# Patient Record
Sex: FEMALE | Race: WHITE | NOT HISPANIC OR LATINO | Employment: FULL TIME | URBAN - NONMETROPOLITAN AREA
[De-identification: names, ages, dates, MRNs, and addresses within clinical notes are randomized per-mention and may not be internally consistent; named-entity substitution may affect disease eponyms.]

---

## 2017-12-07 ENCOUNTER — APPOINTMENT (OUTPATIENT)
Dept: PREADMISSION TESTING | Facility: HOSPITAL | Age: 37
End: 2017-12-07

## 2017-12-07 VITALS
OXYGEN SATURATION: 96 % | HEART RATE: 99 BPM | DIASTOLIC BLOOD PRESSURE: 76 MMHG | BODY MASS INDEX: 35.45 KG/M2 | SYSTOLIC BLOOD PRESSURE: 138 MMHG | RESPIRATION RATE: 16 BRPM | HEIGHT: 64 IN | WEIGHT: 207.67 LBS

## 2017-12-07 DIAGNOSIS — T83.9XXD COMPLICATION OF INTRAUTERINE DEVICE (IUD), UNSPECIFIED COMPLICATION, SUBSEQUENT ENCOUNTER: Primary | ICD-10-CM

## 2017-12-07 DIAGNOSIS — T83.9XXD COMPLICATION OF INTRAUTERINE DEVICE (IUD), UNSPECIFIED COMPLICATION, SUBSEQUENT ENCOUNTER: ICD-10-CM

## 2017-12-07 LAB
BACTERIA UR QL AUTO: ABNORMAL /HPF
BASOPHILS # BLD AUTO: 0.05 10*3/MM3 (ref 0–0.2)
BASOPHILS NFR BLD AUTO: 0.5 % (ref 0–2)
BILIRUB UR QL STRIP: NEGATIVE
CLARITY UR: CLEAR
COLOR UR: YELLOW
DEPRECATED RDW RBC AUTO: 44.7 FL (ref 40–54)
EOSINOPHIL # BLD AUTO: 0.18 10*3/MM3 (ref 0–0.7)
EOSINOPHIL NFR BLD AUTO: 1.8 % (ref 0–4)
ERYTHROCYTE [DISTWIDTH] IN BLOOD BY AUTOMATED COUNT: 13.7 % (ref 12–15)
GLUCOSE UR STRIP-MCNC: NEGATIVE MG/DL
HCT VFR BLD AUTO: 44.3 % (ref 37–47)
HGB BLD-MCNC: 14.7 G/DL (ref 12–16)
HGB UR QL STRIP.AUTO: ABNORMAL
HYALINE CASTS UR QL AUTO: ABNORMAL /LPF
IMM GRANULOCYTES # BLD: 0.03 10*3/MM3 (ref 0–0.03)
IMM GRANULOCYTES NFR BLD: 0.3 % (ref 0–5)
KETONES UR QL STRIP: ABNORMAL
LEUKOCYTE ESTERASE UR QL STRIP.AUTO: NEGATIVE
LYMPHOCYTES # BLD AUTO: 3.31 10*3/MM3 (ref 0.72–4.86)
LYMPHOCYTES NFR BLD AUTO: 33 % (ref 15–45)
MCH RBC QN AUTO: 29.6 PG (ref 28–32)
MCHC RBC AUTO-ENTMCNC: 33.2 G/DL (ref 33–36)
MCV RBC AUTO: 89.1 FL (ref 82–98)
MONOCYTES # BLD AUTO: 0.85 10*3/MM3 (ref 0.19–1.3)
MONOCYTES NFR BLD AUTO: 8.5 % (ref 4–12)
NEUTROPHILS # BLD AUTO: 5.62 10*3/MM3 (ref 1.87–8.4)
NEUTROPHILS NFR BLD AUTO: 55.9 % (ref 39–78)
NITRITE UR QL STRIP: NEGATIVE
NRBC BLD MANUAL-RTO: 0 /100 WBC (ref 0–0)
PH UR STRIP.AUTO: <=5 [PH] (ref 5–8)
PLATELET # BLD AUTO: 295 10*3/MM3 (ref 130–400)
PMV BLD AUTO: 10.7 FL (ref 6–12)
PROT UR QL STRIP: NEGATIVE
RBC # BLD AUTO: 4.97 10*6/MM3 (ref 4.2–5.4)
RBC # UR: ABNORMAL /HPF
REF LAB TEST METHOD: ABNORMAL
SP GR UR STRIP: >=1.03 (ref 1–1.03)
SQUAMOUS #/AREA URNS HPF: ABNORMAL /HPF
UROBILINOGEN UR QL STRIP: ABNORMAL
WBC NRBC COR # BLD: 10.04 10*3/MM3 (ref 4.8–10.8)
WBC UR QL AUTO: ABNORMAL /HPF

## 2017-12-07 PROCEDURE — 85025 COMPLETE CBC W/AUTO DIFF WBC: CPT | Performed by: OBSTETRICS & GYNECOLOGY

## 2017-12-07 PROCEDURE — 81001 URINALYSIS AUTO W/SCOPE: CPT | Performed by: OBSTETRICS & GYNECOLOGY

## 2017-12-07 PROCEDURE — 93005 ELECTROCARDIOGRAM TRACING: CPT

## 2017-12-07 PROCEDURE — 93010 ELECTROCARDIOGRAM REPORT: CPT | Performed by: INTERNAL MEDICINE

## 2017-12-07 PROCEDURE — 36415 COLL VENOUS BLD VENIPUNCTURE: CPT

## 2017-12-07 RX ORDER — AMOXICILLIN 500 MG/1
1000 CAPSULE ORAL 2 TIMES DAILY
COMMUNITY
Start: 2017-12-04 | End: 2017-12-13 | Stop reason: HOSPADM

## 2017-12-07 NOTE — DISCHARGE INSTRUCTIONS
DAY OF SURGERY INSTRUCTIONS        YOUR SURGEON: Dr. Whitehead    PROCEDURE: Hysteroscopy, IUD Removal    DATE OF SURGERY: December 13, 2017    ARRIVAL TIME: AS DIRECTED BY OFFICE    DAY OF SURGERY TAKE ONLY THESE MEDICATIONS: NONE            BEFORE YOU COME TO THE HOSPITAL  (Pre-op instructions)  • Do not eat, drink, smoke or chew gum after midnight the night before surgery.  This also includes no mints.  • Morning of surgery take only the medicines you have been instructed with a sip of water unless otherwise instructed  by your physician.  • Do not shave, wear makeup or dark nail polish.  • Remove all jewelry including rings.  • Leave anything you consider valuable at home.  • Leave your suitcase in the car until after your surgery.  • Bring the following with you if applicable:  o Picture ID and insurance, Medicare or Medicaid cards  o Co-pay/deductible required by insurance (cash, check, credit card)  o Copy of advance directive, living will or power-of- documents if not brought to PAT  o CPAP or BIPAP mask and tubing  o Relaxation aids (MP3 player, book, magazine)  • On the day of surgery check in at registration located at the main entrance of the hospital.       Outpatient Surgery Guidelines, Adult  Outpatient procedures are those for which the person having the procedure is allowed to go home the same day as the procedure. Various procedures are done on an outpatient basis. You should follow some general guidelines if you will be having an outpatient procedure.  LET YOUR HEALTH CARE PROVIDER KNOW ABOUT:  · Any allergies you have.  · All medicines you are taking, including vitamins, herbs, eye drops, creams, and over-the-counter medicines.  · Previous problems you or members of your family have had with the use of anesthetics.  · Any blood disorders you have.  · Previous surgeries you have had.  · Medical conditions you have.  RISKS AND COMPLICATIONS  Your health care provider will discuss possible  risks and complications with you before surgery. Common risks and complications include:    · Problems due to the use of anesthetics.  · Blood loss and replacement (does not apply to minor surgical procedures).  · Temporary increase in pain due to surgery.  · Uncorrected pain or problems that the surgery was meant to correct.  · Infection.  · New damage.  BEFORE THE PROCEDURE  · Ask your health care provider about changing or stopping your regular medicines. You may need to stop taking certain medicines in the days or weeks before the procedure.  · Stop smoking at least 2 weeks before surgery. This lowers your risk for complications during and after surgery. Ask your health care provider for help with this if needed.  · Eat your usual meals and a light supper the day before surgery. Continue fluid intake. Do not drink alcohol.  · Do not eat or drink after midnight the night before your surgery.   · Arrange for someone to take you home and to stay with you for 24 hours after the procedure. Medicine given for your procedure may affect your ability to drive or to care for yourself.  · Call your health care provider's office if you develop an illness or problem that may prevent you from safely having your procedure.  AFTER THE PROCEDURE  After surgery, you will be taken to a recovery area, where your progress will be monitored. If there are no complications, you will be allowed to go home when you are awake, stable, and taking fluids well. You may have numbness around the surgical site. Healing will take some time. You will have tenderness at the surgical site and may have some swelling and bruising. You may also have some nausea.  HOME CARE INSTRUCTIONS  · Do not drive for 24 hours, or as directed by your health care provider. Do not drive while taking prescription pain medicines.  · Do not drink alcohol for 24 hours.  · Do not make important decisions or sign legal documents for 24 hours.  · You may resume a normal  diet and activities as directed.  · Do not lift anything heavier than 10 pounds (4.5 kg) or play contact sports until your health care provider says it is okay.  · Change your bandages (dressings) as directed.  · Only take over-the-counter or prescription medicines as directed by your health care provider.  · Follow up with your health care provider as directed.  SEEK MEDICAL CARE IF:  · You have increased bleeding (more than a small spot) from the surgical site.  · You have redness, swelling, or increasing pain in the wound.  · You see pus coming from the wound.  · You have a fever.  · You notice a bad smell coming from the wound or dressing.  · You feel lightheaded or faint.  · You develop a rash.  · You have trouble breathing.  · You develop allergies.  MAKE SURE YOU:  · Understand these instructions.  · Will watch your condition.  · Will get help right away if you are not doing well or get worse.     This information is not intended to replace advice given to you by your health care provider. Make sure you discuss any questions you have with your health care provider.     Document Released: 09/12/2002 Document Revised: 05/03/2016 Document Reviewed: 05/22/2014  [employer] Interactive Patient Education ©2016 [employer]       Fall Prevention in Hospitals, Adult  As a hospital patient, your condition and the treatments you receive can increase your risk for falls. Some additional risk factors for falls in a hospital include:  · Being in an unfamiliar environment.  · Being on bed rest.  · Your surgery.  · Taking certain medicines.  · Your tubing requirements, such as intravenous (IV) therapy or catheters.  It is important that you learn how to decrease fall risks while at the hospital. Below are important tips that can help prevent falls.  SAFETY TIPS FOR PREVENTING FALLS  Talk about your risk of falling.  · Ask your health care provider why you are at risk for falling. Is it your medicine, illness, tubing  placement, or something else?  · Make a plan with your health care provider to keep you safe from falls.  · Ask your health care provider or pharmacist about side effects of your medicines. Some medicines can make you dizzy or affect your coordination.  Ask for help.  · Ask for help before getting out of bed. You may need to press your call button.  · Ask for assistance in getting safely to the toilet.  · Ask for a walker or cane to be put at your bedside. Ask that most of the side rails on your bed be placed up before your health care provider leaves the room.  · Ask family or friends to sit with you.  · Ask for things that are out of your reach, such as your glasses, hearing aids, telephone, bedside table, or call button.  Follow these tips to avoid falling:  · Stay lying or seated, rather than standing, while waiting for help.  · Wear rubber-soled slippers or shoes whenever you walk in the hospital.  · Avoid quick, sudden movements.  ¨ Change positions slowly.  ¨ Sit on the side of your bed before standing.  ¨ Stand up slowly and wait before you start to walk.  · Let your health care provider know if there is a spill on the floor.  · Pay careful attention to the medical equipment, electrical cords, and tubes around you.  · When you need help, use your call button by your bed or in the bathroom. Wait for one of your health care providers to help you.  · If you feel dizzy or unsure of your footing, return to bed and wait for assistance.  · Avoid being distracted by the TV, telephone, or another person in your room.  · Do not lean or support yourself on rolling objects, such as IV poles or bedside tables.     This information is not intended to replace advice given to you by your health care provider. Make sure you discuss any questions you have with your health care provider.     Document Released: 12/15/2001 Document Revised: 01/08/2016 Document Reviewed: 08/25/2013  [employer] Interactive Patient Education ©2016  [employer]       Surgical Site Infections FAQs  What is a Surgical Site Infection (SSI)?  A surgical site infection is an infection that occurs after surgery in the part of the body where the surgery took place. Most patients who have surgery do not develop an infection. However, infections develop in about 1 to 3 out of every 100 patients who have surgery.  Some of the common symptoms of a surgical site infection are:  · Redness and pain around the area where you had surgery  · Drainage of cloudy fluid from your surgical wound  · Fever  Can SSIs be treated?  Yes. Most surgical site infections can be treated with antibiotics. The antibiotic given to you depends on the bacteria (germs) causing the infection. Sometimes patients with SSIs also need another surgery to treat the infection.  What are some of the things that hospitals are doing to prevent SSIs?  To prevent SSIs, doctors, nurses, and other healthcare providers:  · Clean their hands and arms up to their elbows with an antiseptic agent just before the surgery.  · Clean their hands with soap and water or an alcohol-based hand rub before and after caring for each patient.  · May remove some of your hair immediately before your surgery using electric clippers if the hair is in the same area where the procedure will occur. They should not shave you with a razor.  · Wear special hair covers, masks, gowns, and gloves during surgery to keep the surgery area clean.  · Give you antibiotics before your surgery starts. In most cases, you should get antibiotics within 60 minutes before the surgery starts and the antibiotics should be stopped within 24 hours after surgery.  · Clean the skin at the site of your surgery with a special soap that kills germs.  What can I do to help prevent SSIs?  Before your surgery:  · Tell your doctor about other medical problems you may have. Health problems such as allergies, diabetes, and obesity could affect your surgery and your  treatment.  · Quit smoking. Patients who smoke get more infections. Talk to your doctor about how you can quit before your surgery.  · Do not shave near where you will have surgery. Shaving with a razor can irritate your skin and make it easier to develop an infection.  At the time of your surgery:  · Speak up if someone tries to shave you with a razor before surgery. Ask why you need to be shaved and talk with your surgeon if you have any concerns.  · Ask if you will get antibiotics before surgery.  After your surgery:  · Make sure that your healthcare providers clean their hands before examining you, either with soap and water or an alcohol-based hand rub.  · If you do not see your providers clean their hands, please ask them to do so.  · Family and friends who visit you should not touch the surgical wound or dressings.  · Family and friends should clean their hands with soap and water or an alcohol-based hand rub before and after visiting you. If you do not see them clean their hands, ask them to clean their hands.  What do I need to do when I go home from the hospital?  · Before you go home, your doctor or nurse should explain everything you need to know about taking care of your wound. Make sure you understand how to care for your wound before you leave the hospital.  · Always clean your hands before and after caring for your wound.  · Before you go home, make sure you know who to contact if you have questions or problems after you get home.  · If you have any symptoms of an infection, such as redness and pain at the surgery site, drainage, or fever, call your doctor immediately.  If you have additional questions, please ask your doctor or nurse.  Developed and co-sponsored by The Society for Healthcare Epidemiology of [place] (SHEA); Infectious Diseases Society of [place] (IDSA); American Hospital Association; Association for Professionals in Infection Control and Epidemiology (APIC); Centers for Disease  Control and Prevention (CDC); and The Joint Commission.     This information is not intended to replace advice given to you by your health care provider. Make sure you discuss any questions you have with your health care provider.     Document Released: 12/23/2014 Document Revised: 01/08/2016 Document Reviewed: 03/02/2016  [email or web address] Interactive Patient Education ©2016 [email or web address]     PATIENT/FAMILY/RESPONSIBLE PARTY VERBALIZES UNDERSTANDING OF ABOVE EDUCATION.  COPY OF PAIN SCALE GIVEN AND REVIEWED WITH VERBALIZED UNDERSTANDING.

## 2017-12-13 ENCOUNTER — ANESTHESIA EVENT (OUTPATIENT)
Dept: PERIOP | Facility: HOSPITAL | Age: 37
End: 2017-12-13

## 2017-12-13 ENCOUNTER — HOSPITAL ENCOUNTER (OUTPATIENT)
Facility: HOSPITAL | Age: 37
Setting detail: HOSPITAL OUTPATIENT SURGERY
Discharge: HOME | End: 2017-12-13
Attending: OBSTETRICS & GYNECOLOGY | Admitting: OBSTETRICS & GYNECOLOGY

## 2017-12-13 ENCOUNTER — ANESTHESIA (OUTPATIENT)
Dept: PERIOP | Facility: HOSPITAL | Age: 37
End: 2017-12-13

## 2017-12-13 VITALS
HEART RATE: 78 BPM | TEMPERATURE: 98 F | RESPIRATION RATE: 16 BRPM | DIASTOLIC BLOOD PRESSURE: 63 MMHG | OXYGEN SATURATION: 96 % | SYSTOLIC BLOOD PRESSURE: 119 MMHG

## 2017-12-13 LAB
ABO GROUP BLD: NORMAL
BLD GP AB SCN SERPL QL: NEGATIVE
HCG INTACT+B SERPL-ACNC: <2.39 MIU/ML (ref 0–5)
RH BLD: POSITIVE

## 2017-12-13 PROCEDURE — 86850 RBC ANTIBODY SCREEN: CPT | Performed by: OBSTETRICS & GYNECOLOGY

## 2017-12-13 PROCEDURE — 25010000002 PROPOFOL 10 MG/ML EMULSION: Performed by: NURSE ANESTHETIST, CERTIFIED REGISTERED

## 2017-12-13 PROCEDURE — 25010000002 DEXAMETHASONE PER 1 MG: Performed by: NURSE ANESTHETIST, CERTIFIED REGISTERED

## 2017-12-13 PROCEDURE — 25010000002 ONDANSETRON PER 1 MG: Performed by: NURSE ANESTHETIST, CERTIFIED REGISTERED

## 2017-12-13 PROCEDURE — 86900 BLOOD TYPING SEROLOGIC ABO: CPT | Performed by: OBSTETRICS & GYNECOLOGY

## 2017-12-13 PROCEDURE — 25010000002 FENTANYL CITRATE (PF) 100 MCG/2ML SOLUTION: Performed by: NURSE ANESTHETIST, CERTIFIED REGISTERED

## 2017-12-13 PROCEDURE — 25010000002 DEXAMETHASONE PER 1 MG: Performed by: ANESTHESIOLOGY

## 2017-12-13 PROCEDURE — 84702 CHORIONIC GONADOTROPIN TEST: CPT | Performed by: OBSTETRICS & GYNECOLOGY

## 2017-12-13 PROCEDURE — 25010000002 MIDAZOLAM PER 1 MG: Performed by: ANESTHESIOLOGY

## 2017-12-13 PROCEDURE — 86901 BLOOD TYPING SEROLOGIC RH(D): CPT | Performed by: OBSTETRICS & GYNECOLOGY

## 2017-12-13 PROCEDURE — 25010000002 KETOROLAC TROMETHAMINE PER 15 MG: Performed by: NURSE ANESTHETIST, CERTIFIED REGISTERED

## 2017-12-13 RX ORDER — PROMETHAZINE HYDROCHLORIDE 25 MG/ML
12.5 INJECTION, SOLUTION INTRAMUSCULAR; INTRAVENOUS ONCE AS NEEDED
Status: DISCONTINUED | OUTPATIENT
Start: 2017-12-13 | End: 2017-12-13 | Stop reason: HOSPADM

## 2017-12-13 RX ORDER — SODIUM CHLORIDE, SODIUM LACTATE, POTASSIUM CHLORIDE, CALCIUM CHLORIDE 600; 310; 30; 20 MG/100ML; MG/100ML; MG/100ML; MG/100ML
1000 INJECTION, SOLUTION INTRAVENOUS CONTINUOUS
Status: DISCONTINUED | OUTPATIENT
Start: 2017-12-13 | End: 2017-12-13 | Stop reason: HOSPADM

## 2017-12-13 RX ORDER — KETOROLAC TROMETHAMINE 30 MG/ML
INJECTION, SOLUTION INTRAMUSCULAR; INTRAVENOUS AS NEEDED
Status: DISCONTINUED | OUTPATIENT
Start: 2017-12-13 | End: 2017-12-13 | Stop reason: SURG

## 2017-12-13 RX ORDER — IBUPROFEN 600 MG/1
600 TABLET ORAL EVERY 6 HOURS PRN
Qty: 90 TABLET | Refills: 2 | Status: SHIPPED | OUTPATIENT
Start: 2017-12-13 | End: 2022-10-28

## 2017-12-13 RX ORDER — DEXAMETHASONE SODIUM PHOSPHATE 4 MG/ML
4 INJECTION, SOLUTION INTRA-ARTICULAR; INTRALESIONAL; INTRAMUSCULAR; INTRAVENOUS; SOFT TISSUE ONCE AS NEEDED
Status: COMPLETED | OUTPATIENT
Start: 2017-12-13 | End: 2017-12-13

## 2017-12-13 RX ORDER — SODIUM CHLORIDE, SODIUM LACTATE, POTASSIUM CHLORIDE, CALCIUM CHLORIDE 600; 310; 30; 20 MG/100ML; MG/100ML; MG/100ML; MG/100ML
100 INJECTION, SOLUTION INTRAVENOUS CONTINUOUS
Status: DISCONTINUED | OUTPATIENT
Start: 2017-12-13 | End: 2017-12-13 | Stop reason: HOSPADM

## 2017-12-13 RX ORDER — FENTANYL CITRATE 50 UG/ML
INJECTION, SOLUTION INTRAMUSCULAR; INTRAVENOUS AS NEEDED
Status: DISCONTINUED | OUTPATIENT
Start: 2017-12-13 | End: 2017-12-13 | Stop reason: SURG

## 2017-12-13 RX ORDER — NALOXONE HCL 0.4 MG/ML
0.04 VIAL (ML) INJECTION AS NEEDED
Status: DISCONTINUED | OUTPATIENT
Start: 2017-12-13 | End: 2017-12-13 | Stop reason: HOSPADM

## 2017-12-13 RX ORDER — SODIUM CHLORIDE 9 MG/ML
INJECTION, SOLUTION INTRAVENOUS AS NEEDED
Status: DISCONTINUED | OUTPATIENT
Start: 2017-12-13 | End: 2017-12-13 | Stop reason: HOSPADM

## 2017-12-13 RX ORDER — MIDAZOLAM HYDROCHLORIDE 1 MG/ML
1 INJECTION INTRAMUSCULAR; INTRAVENOUS
Status: DISCONTINUED | OUTPATIENT
Start: 2017-12-13 | End: 2017-12-13 | Stop reason: HOSPADM

## 2017-12-13 RX ORDER — HYDRALAZINE HYDROCHLORIDE 20 MG/ML
5 INJECTION INTRAMUSCULAR; INTRAVENOUS
Status: DISCONTINUED | OUTPATIENT
Start: 2017-12-13 | End: 2017-12-13 | Stop reason: HOSPADM

## 2017-12-13 RX ORDER — IBUPROFEN 600 MG/1
600 TABLET ORAL EVERY 6 HOURS PRN
Status: DISCONTINUED | OUTPATIENT
Start: 2017-12-13 | End: 2017-12-13 | Stop reason: HOSPADM

## 2017-12-13 RX ORDER — ONDANSETRON 2 MG/ML
4 INJECTION INTRAMUSCULAR; INTRAVENOUS AS NEEDED
Status: DISCONTINUED | OUTPATIENT
Start: 2017-12-13 | End: 2017-12-13 | Stop reason: HOSPADM

## 2017-12-13 RX ORDER — METOCLOPRAMIDE HYDROCHLORIDE 5 MG/ML
5 INJECTION INTRAMUSCULAR; INTRAVENOUS
Status: DISCONTINUED | OUTPATIENT
Start: 2017-12-13 | End: 2017-12-13 | Stop reason: HOSPADM

## 2017-12-13 RX ORDER — IPRATROPIUM BROMIDE AND ALBUTEROL SULFATE 2.5; .5 MG/3ML; MG/3ML
3 SOLUTION RESPIRATORY (INHALATION) ONCE AS NEEDED
Status: DISCONTINUED | OUTPATIENT
Start: 2017-12-13 | End: 2017-12-13 | Stop reason: HOSPADM

## 2017-12-13 RX ORDER — MEPERIDINE HYDROCHLORIDE 25 MG/ML
12.5 INJECTION INTRAMUSCULAR; INTRAVENOUS; SUBCUTANEOUS
Status: DISCONTINUED | OUTPATIENT
Start: 2017-12-13 | End: 2017-12-13 | Stop reason: HOSPADM

## 2017-12-13 RX ORDER — OXYCODONE HYDROCHLORIDE AND ACETAMINOPHEN 5; 325 MG/1; MG/1
1 TABLET ORAL EVERY 6 HOURS PRN
Qty: 5 TABLET | Refills: 0 | Status: SHIPPED | OUTPATIENT
Start: 2017-12-13 | End: 2022-10-28

## 2017-12-13 RX ORDER — SODIUM CHLORIDE 0.9 % (FLUSH) 0.9 %
3 SYRINGE (ML) INJECTION AS NEEDED
Status: DISCONTINUED | OUTPATIENT
Start: 2017-12-13 | End: 2017-12-13 | Stop reason: HOSPADM

## 2017-12-13 RX ORDER — PROPOFOL 10 MG/ML
VIAL (ML) INTRAVENOUS AS NEEDED
Status: DISCONTINUED | OUTPATIENT
Start: 2017-12-13 | End: 2017-12-13 | Stop reason: SURG

## 2017-12-13 RX ORDER — FLUMAZENIL 0.1 MG/ML
0.2 INJECTION INTRAVENOUS AS NEEDED
Status: DISCONTINUED | OUTPATIENT
Start: 2017-12-13 | End: 2017-12-13 | Stop reason: HOSPADM

## 2017-12-13 RX ORDER — ONDANSETRON 2 MG/ML
4 INJECTION INTRAMUSCULAR; INTRAVENOUS ONCE AS NEEDED
Status: DISCONTINUED | OUTPATIENT
Start: 2017-12-13 | End: 2017-12-13 | Stop reason: HOSPADM

## 2017-12-13 RX ORDER — OXYCODONE AND ACETAMINOPHEN 7.5; 325 MG/1; MG/1
1 TABLET ORAL EVERY 6 HOURS PRN
Status: DISCONTINUED | OUTPATIENT
Start: 2017-12-13 | End: 2017-12-13 | Stop reason: HOSPADM

## 2017-12-13 RX ORDER — MIDAZOLAM HYDROCHLORIDE 1 MG/ML
2 INJECTION INTRAMUSCULAR; INTRAVENOUS
Status: DISCONTINUED | OUTPATIENT
Start: 2017-12-13 | End: 2017-12-13 | Stop reason: HOSPADM

## 2017-12-13 RX ORDER — LIDOCAINE HYDROCHLORIDE 10 MG/ML
INJECTION, SOLUTION INFILTRATION; PERINEURAL AS NEEDED
Status: DISCONTINUED | OUTPATIENT
Start: 2017-12-13 | End: 2017-12-13 | Stop reason: HOSPADM

## 2017-12-13 RX ORDER — DEXAMETHASONE SODIUM PHOSPHATE 4 MG/ML
INJECTION, SOLUTION INTRA-ARTICULAR; INTRALESIONAL; INTRAMUSCULAR; INTRAVENOUS; SOFT TISSUE AS NEEDED
Status: DISCONTINUED | OUTPATIENT
Start: 2017-12-13 | End: 2017-12-13 | Stop reason: SURG

## 2017-12-13 RX ORDER — MORPHINE SULFATE 2 MG/ML
2 INJECTION, SOLUTION INTRAMUSCULAR; INTRAVENOUS AS NEEDED
Status: DISCONTINUED | OUTPATIENT
Start: 2017-12-13 | End: 2017-12-13 | Stop reason: HOSPADM

## 2017-12-13 RX ORDER — ONDANSETRON 2 MG/ML
INJECTION INTRAMUSCULAR; INTRAVENOUS AS NEEDED
Status: DISCONTINUED | OUTPATIENT
Start: 2017-12-13 | End: 2017-12-13 | Stop reason: SURG

## 2017-12-13 RX ORDER — LABETALOL HYDROCHLORIDE 5 MG/ML
5 INJECTION, SOLUTION INTRAVENOUS
Status: DISCONTINUED | OUTPATIENT
Start: 2017-12-13 | End: 2017-12-13 | Stop reason: HOSPADM

## 2017-12-13 RX ORDER — SODIUM CHLORIDE 0.9 % (FLUSH) 0.9 %
1-10 SYRINGE (ML) INJECTION AS NEEDED
Status: DISCONTINUED | OUTPATIENT
Start: 2017-12-13 | End: 2017-12-13 | Stop reason: HOSPADM

## 2017-12-13 RX ADMIN — MIDAZOLAM 2 MG: 1 INJECTION INTRAMUSCULAR; INTRAVENOUS at 08:37

## 2017-12-13 RX ADMIN — FENTANYL CITRATE 50 MCG: 50 INJECTION, SOLUTION INTRAMUSCULAR; INTRAVENOUS at 08:50

## 2017-12-13 RX ADMIN — DEXAMETHASONE SODIUM PHOSPHATE 8 MG: 4 INJECTION, SOLUTION INTRAMUSCULAR; INTRAVENOUS at 08:46

## 2017-12-13 RX ADMIN — FENTANYL CITRATE 50 MCG: 50 INJECTION, SOLUTION INTRAMUSCULAR; INTRAVENOUS at 08:55

## 2017-12-13 RX ADMIN — PROPOFOL 200 MG: 10 INJECTION, EMULSION INTRAVENOUS at 08:46

## 2017-12-13 RX ADMIN — ONDANSETRON HYDROCHLORIDE 4 MG: 2 SOLUTION INTRAMUSCULAR; INTRAVENOUS at 08:46

## 2017-12-13 RX ADMIN — LIDOCAINE HYDROCHLORIDE 0.5 ML: 10 INJECTION, SOLUTION EPIDURAL; INFILTRATION; INTRACAUDAL; PERINEURAL at 07:10

## 2017-12-13 RX ADMIN — SODIUM CHLORIDE, POTASSIUM CHLORIDE, SODIUM LACTATE AND CALCIUM CHLORIDE 1000 ML: 600; 310; 30; 20 INJECTION, SOLUTION INTRAVENOUS at 07:10

## 2017-12-13 RX ADMIN — DEXAMETHASONE SODIUM PHOSPHATE 4 MG: 4 INJECTION, SOLUTION INTRAMUSCULAR; INTRAVENOUS at 08:37

## 2017-12-13 RX ADMIN — KETOROLAC TROMETHAMINE 30 MG: 30 INJECTION, SOLUTION INTRAMUSCULAR at 09:03

## 2017-12-13 NOTE — PLAN OF CARE
Problem: Perioperative Period (Adult)  Goal: Signs and Symptoms of Listed Potential Problems Will be Absent or Manageable (Perioperative Period)  Outcome: Outcome(s) achieved Date Met:  12/13/17 12/13/17 0956   Perioperative Period   Problems Assessed (Perioperative Period) all   Problems Present (Perioperative Period) none

## 2017-12-13 NOTE — ANESTHESIA PROCEDURE NOTES
Airway  Urgency: elective    Airway not difficult    General Information and Staff    Patient location during procedure: OR  CRNA: MARY BETH LYLES    Indications and Patient Condition  Indications for airway management: airway protection    Preoxygenated: yes  MILS maintained throughout  Mask difficulty assessment: 0 - not attempted    Final Airway Details  Final airway type: supraglottic airway      Successful airway: I-gel  Size 4    Number of attempts at approach: 1

## 2017-12-13 NOTE — PLAN OF CARE
Problem: Patient Care Overview (Adult)  Goal: Plan of Care Review  Outcome: Ongoing (interventions implemented as appropriate)    12/13/17 0921   Coping/Psychosocial Response Interventions   Plan Of Care Reviewed With patient   Patient Care Overview   Progress improving   Outcome Evaluation   Outcome Summary/Follow up Plan Meets PACU d/c criteria         Problem: Perioperative Period (Adult)  Goal: Signs and Symptoms of Listed Potential Problems Will be Absent or Manageable (Perioperative Period)  Outcome: Ongoing (interventions implemented as appropriate)

## 2017-12-13 NOTE — ANESTHESIA POSTPROCEDURE EVALUATION
Patient: Neelima Gonzalez    Procedure Summary     Date Anesthesia Start Anesthesia Stop Room / Location    12/13/17 0844 0907  [facility] OR 06 / [facility] OR       Procedure Diagnosis Surgeon Provider    HYSTEROSCOPY/ IUD REMOVAL  (N/A Uterus) (RETAINED IUD/ FAILED IUD REMOVAL ) MD Daniel Aguilera CRNA          Anesthesia Type: general  Last vitals  BP   119/63 (12/13/17 0950)   Temp   98 °F (36.7 °C) (12/13/17 0930)   Pulse   78 (12/13/17 0950)   Resp   16 (12/13/17 0950)     SpO2   96 % (12/13/17 0950)     Post Anesthesia Care and Evaluation    Patient location during evaluation: PACU  Patient participation: complete - patient participated  Level of consciousness: awake and alert  Pain management: adequate  Airway patency: patent  Anesthetic complications: No anesthetic complications  PONV Status: none  Cardiovascular status: acceptable and hemodynamically stable  Respiratory status: acceptable  Hydration status: acceptable    Comments: Blood pressure 119/63, pulse 78, temperature 98 °F (36.7 °C), temperature source Temporal Artery , resp. rate 16, SpO2 96 %.    Patient discharged from PACU based upon Suzie score. Please see RN notes for further details

## 2017-12-13 NOTE — PLAN OF CARE
Problem: Patient Care Overview (Adult)  Goal: Plan of Care Review  Outcome: Outcome(s) achieved Date Met:  12/13/17 12/13/17 0956   Coping/Psychosocial Response Interventions   Plan Of Care Reviewed With patient;family   Patient Care Overview   Progress improving

## 2017-12-13 NOTE — OP NOTE
Subjective     Date of Service:  12/13/17  Time of Service:  9:06 AM    Surgical Staff: Surgeon(s) and Role:     * Meg Whitehead MD - Primary   Additional Staff: None   Pre-operative diagnosis(es): Retained IUD     Post-operative diagnosis(es): Same   Procedure(s): Procedure(s):  Hysteroscopy  IUD removal     Antibiotics: None     Anesthesia: Type: General     Objective        Operative findings: IUD placed normally in uterine cavity with string inside cervical os   Specimens removed: IUD - disposed, not sent to path   Fluid Intake: 600mL   Output: Documented Output  Est. Blood Loss 0mL  Urine Output 100mL            Implant Information: None   Complications: None   Intraoperative consult(s):    Condition: stable       Disposition: to PACU and then  home         Discription of procedure:        TECHNIQUE:  After informed consent, the patient was taken to the operating room, where general anesthesia was administered without complications. The patient was then examined under anesthesia, and noted to have a normal uterus without adnexal masses. She was then placed in candycane stirrups and prepped and draped for surgery.     The anterior lip of the cervix was grasped with a single-tooth tenaculum. The cervix was subsequently was dilated to 6-Mcintosh. The lighted hysteroscope was inserted using normal saline as a distending medium. The cavity was noted to have the above findings. The hysteroscope was removed and using a bandage forceps, the string was grasped and the IUD removed.     The patient tolerated the procedure well, and sponge, lap and needle counts were correct x 2.      Assessment/Plan     Hysteroscopy, IUD removal  Follow up with Dr. Whitehead in office in 2 weeks        Meg Whitehead MD  12/13/17  9:09 AM

## 2017-12-13 NOTE — PLAN OF CARE
Problem: Perioperative Period (Adult)  Goal: Signs and Symptoms of Listed Potential Problems Will be Absent or Manageable (Perioperative Period)  Outcome: Ongoing (interventions implemented as appropriate)    12/13/17 8673   Perioperative Period   Problems Assessed (Perioperative Period) pain;hypothermia;hypoxia/hypoxemia;situational response   Problems Present (Perioperative Period) situational response

## 2017-12-13 NOTE — ANESTHESIA PREPROCEDURE EVALUATION
Anesthesia Evaluation     Patient summary reviewed   no history of anesthetic complications:  NPO Solid Status: > 8 hours  NPO Liquid Status: > 8 hours     Airway   Mallampati: I  TM distance: >3 FB  Neck ROM: full  no difficulty expected  Dental - normal exam     Pulmonary - negative pulmonary ROS   Cardiovascular - negative cardio ROS  Exercise tolerance: good (4-7 METS)        Neuro/Psych- negative ROS  GI/Hepatic/Renal/Endo    (+) obesity,      Musculoskeletal (-) negative ROS    Abdominal    Substance History - negative use     OB/GYN          Other - negative ROS                                       Anesthesia Plan    ASA 2     general     intravenous induction   Anesthetic plan and risks discussed with patient.

## 2017-12-13 NOTE — PLAN OF CARE
Problem: Patient Care Overview (Adult)  Goal: Plan of Care Review  Outcome: Ongoing (interventions implemented as appropriate)    12/13/17 0841   Coping/Psychosocial Response Interventions   Plan Of Care Reviewed With patient   Patient Care Overview   Progress no change

## 2020-04-13 NOTE — PROGRESS NOTES
2017    KNEE SURGERY  1993    LITHOTRIPSY  2005    UPPER GASTROINTESTINAL ENDOSCOPY  2011         Current Outpatient Medications:     ibuprofen (ADVIL;MOTRIN) 600 MG tablet, Take 600 mg by mouth every 6 hours as needed, Disp: , Rfl:     hyoscyamine (ANASPAZ;LEVSIN) 125 MCG tablet, Take 125 mcg by mouth every 4 hours as needed for Cramping, Disp: , Rfl:      Allergies   Allergen Reactions    Azithromycin Hives    Sulfa Antibiotics      Other reaction(s): Nausea And Vomiting       Social History     Tobacco Use    Smoking status: Never Smoker    Smokeless tobacco: Never Used   Substance Use Topics    Alcohol use: Not Currently    Drug use: Never       Family History   Problem Relation Age of Onset    High Blood Pressure Mother     Osteoporosis Mother     High Cholesterol Mother     High Blood Pressure Father     Stroke Father     Diabetes Father     High Blood Pressure Brother     Other Maternal Grandmother         MDS    Cancer Maternal Grandmother         Skin Cancer    High Blood Pressure Maternal Grandmother     Stroke Maternal Grandmother     Stroke Paternal Grandmother     Heart Disease Paternal Grandmother     Diabetes Paternal Grandmother        Subjective   REVIEW OF SYSTEMS:   Review of Systems   Constitutional: Positive for fatigue (Moderate). Negative for chills, diaphoresis, fever and unexpected weight change. HENT: Negative for mouth sores, nosebleeds, sore throat, trouble swallowing and voice change. Eyes: Negative for photophobia, discharge and itching. Respiratory: Negative for cough, shortness of breath and wheezing. Cardiovascular: Positive for palpitations. Negative for chest pain and leg swelling. Gastrointestinal: Negative for abdominal distention, abdominal pain, blood in stool, constipation, diarrhea, nausea and vomiting. Endocrine: Negative for cold intolerance, heat intolerance, polydipsia and polyuria.    Genitourinary: Positive for menstrual problem (Pain). Negative for difficulty urinating, dysuria, hematuria and urgency. Musculoskeletal: Negative for arthralgias, back pain, joint swelling and myalgias. Skin: Negative for color change and rash. Neurological: Negative for dizziness, tremors, seizures, syncope and light-headedness. Hematological: Negative for adenopathy. Does not bruise/bleed easily. Psychiatric/Behavioral: Negative for behavioral problems and suicidal ideas. The patient is not nervous/anxious. All other systems reviewed and are negative. Objective   /84   Pulse 113   Temp 98.6 °F (37 °C) (Oral)   Ht 5' 4\" (1.626 m)   Wt 210 lb 8 oz (95.5 kg)   SpO2 98%   BMI 36.13 kg/m²     PHYSICAL EXAM:  Physical Exam  Constitutional:       Appearance: She is well-developed. HENT:      Head: Normocephalic and atraumatic. Eyes:      General: No scleral icterus. Conjunctiva/sclera: Conjunctivae normal.   Neck:      Musculoskeletal: Normal range of motion and neck supple. Trachea: No tracheal deviation. Cardiovascular:      Rate and Rhythm: Regular rhythm. Tachycardia present. Heart sounds: Normal heart sounds. No murmur. Pulmonary:      Effort: Pulmonary effort is normal. No respiratory distress. Breath sounds: Normal breath sounds. Abdominal:      General: Bowel sounds are normal. There is no distension. Palpations: Abdomen is soft. Tenderness: There is no abdominal tenderness. Musculoskeletal:         General: No tenderness. Comments: Full ROM in all 4 extremities   Skin:     General: Skin is warm and dry. Findings: No rash. Neurological:      Mental Status: She is alert and oriented to person, place, and time. Coordination: Coordination normal.   Psychiatric:         Behavior: Behavior normal.         Thought Content: Thought content normal.         VISIT DIAGNOSES  1. Hypochromic red blood cells    2.  Other fatigue        CBC 3/26/2020 revealed a WBC of 5.92 with normal

## 2020-04-15 ENCOUNTER — HOSPITAL ENCOUNTER (OUTPATIENT)
Dept: INFUSION THERAPY | Age: 40
Discharge: HOME | End: 2020-04-15
Payer: COMMERCIAL

## 2020-04-15 ENCOUNTER — OFFICE VISIT (OUTPATIENT)
Dept: HEMATOLOGY | Age: 40
End: 2020-04-15
Payer: COMMERCIAL

## 2020-04-15 VITALS
TEMPERATURE: 98.6 F | OXYGEN SATURATION: 98 % | SYSTOLIC BLOOD PRESSURE: 122 MMHG | HEART RATE: 113 BPM | HEIGHT: 64 IN | BODY MASS INDEX: 35.94 KG/M2 | WEIGHT: 210.5 LBS | DIASTOLIC BLOOD PRESSURE: 84 MMHG

## 2020-04-15 DIAGNOSIS — R53.83 OTHER FATIGUE: ICD-10-CM

## 2020-04-15 DIAGNOSIS — D50.8 HYPOCHROMIC RED BLOOD CELLS: ICD-10-CM

## 2020-04-15 PROCEDURE — 36415 COLL VENOUS BLD VENIPUNCTURE: CPT

## 2020-04-15 PROCEDURE — 84443 ASSAY THYROID STIM HORMONE: CPT

## 2020-04-15 PROCEDURE — 99202 OFFICE O/P NEW SF 15 MIN: CPT

## 2020-04-15 PROCEDURE — 80053 COMPREHEN METABOLIC PANEL: CPT

## 2020-04-15 PROCEDURE — 83540 ASSAY OF IRON: CPT

## 2020-04-15 PROCEDURE — 99212 OFFICE O/P EST SF 10 MIN: CPT

## 2020-04-15 PROCEDURE — 85025 COMPLETE CBC W/AUTO DIFF WBC: CPT

## 2020-04-15 PROCEDURE — 99203 OFFICE O/P NEW LOW 30 MIN: CPT | Performed by: PHYSICIAN ASSISTANT

## 2020-04-15 PROCEDURE — 83550 IRON BINDING TEST: CPT

## 2020-04-15 PROCEDURE — 82728 ASSAY OF FERRITIN: CPT

## 2020-04-15 RX ORDER — OXYCODONE HYDROCHLORIDE AND ACETAMINOPHEN 5; 325 MG/1; MG/1
1 TABLET ORAL EVERY 6 HOURS PRN
COMMUNITY
Start: 2017-12-13 | End: 2020-04-15

## 2020-04-15 RX ORDER — HYOSCYAMINE SULFATE 0.125 MG
125 TABLET ORAL EVERY 4 HOURS PRN
COMMUNITY
End: 2021-11-18

## 2020-04-15 RX ORDER — IBUPROFEN 600 MG/1
600 TABLET ORAL EVERY 6 HOURS PRN
COMMUNITY
Start: 2017-12-13 | End: 2020-06-22

## 2020-04-15 ASSESSMENT — ENCOUNTER SYMPTOMS
VOMITING: 0
SHORTNESS OF BREATH: 0
PHOTOPHOBIA: 0
EYE DISCHARGE: 0
COLOR CHANGE: 0
ABDOMINAL PAIN: 0
COUGH: 0
ABDOMINAL DISTENTION: 0
SORE THROAT: 0
BACK PAIN: 0
VOICE CHANGE: 0
NAUSEA: 0
DIARRHEA: 0
BLOOD IN STOOL: 0
WHEEZING: 0
TROUBLE SWALLOWING: 0
CONSTIPATION: 0
EYE ITCHING: 0

## 2020-06-09 ENCOUNTER — HOSPITAL ENCOUNTER (OUTPATIENT)
Dept: INFUSION THERAPY | Age: 40
Discharge: HOME | End: 2020-06-09
Payer: COMMERCIAL

## 2020-06-09 DIAGNOSIS — R53.83 OTHER FATIGUE: ICD-10-CM

## 2020-06-09 LAB
ALBUMIN SERPL-MCNC: 4.2 G/DL (ref 3.5–5.2)
ALP BLD-CCNC: 99 U/L (ref 35–104)
ALT SERPL-CCNC: 48 U/L (ref 9–52)
ANION GAP SERPL CALCULATED.3IONS-SCNC: 9 MMOL/L (ref 7–19)
AST SERPL-CCNC: 40 U/L (ref 14–36)
BASOPHILS ABSOLUTE: 0.02 K/UL (ref 0.01–0.08)
BASOPHILS RELATIVE PERCENT: 0.3 % (ref 0.1–1.2)
BILIRUB SERPL-MCNC: 0.4 MG/DL (ref 0.2–1.3)
BUN BLDV-MCNC: 16 MG/DL (ref 7–17)
CALCIUM SERPL-MCNC: 8.7 MG/DL (ref 8.4–10.2)
CHLORIDE BLD-SCNC: 105 MMOL/L (ref 98–111)
CO2: 25 MMOL/L (ref 22–29)
CREAT SERPL-MCNC: 0.6 MG/DL (ref 0.5–1)
EOSINOPHILS ABSOLUTE: 0.19 K/UL (ref 0.04–0.54)
EOSINOPHILS RELATIVE PERCENT: 2.6 % (ref 0.7–7)
FERRITIN: 150 NG/ML (ref 6.2–137)
GFR NON-AFRICAN AMERICAN: >60
GLOBULIN: 2.9 G/DL
GLUCOSE BLD-MCNC: 103 MG/DL (ref 74–106)
HCT VFR BLD CALC: 43.2 % (ref 34.1–44.9)
HEMOGLOBIN: 15 G/DL (ref 11.2–15.7)
IRON SATURATION: 20 % (ref 14–50)
IRON: 75 UG/DL (ref 37–170)
LYMPHOCYTES ABSOLUTE: 2.25 K/UL (ref 1.18–3.74)
LYMPHOCYTES RELATIVE PERCENT: 30.4 % (ref 19.3–53.1)
MCH RBC QN AUTO: 31.2 PG (ref 25.6–32.2)
MCHC RBC AUTO-ENTMCNC: 34.7 G/DL (ref 32.3–35.5)
MCV RBC AUTO: 89.8 FL (ref 79.4–94.8)
MONOCYTES ABSOLUTE: 0.7 K/UL (ref 0.24–0.82)
MONOCYTES RELATIVE PERCENT: 9.4 % (ref 4.7–12.5)
NEUTROPHILS ABSOLUTE: 4.25 K/UL (ref 1.56–6.13)
NEUTROPHILS RELATIVE PERCENT: 57.3 % (ref 34–71.1)
PDW BLD-RTO: 12.8 % (ref 11.7–14.4)
PLATELET # BLD: 201 K/UL (ref 182–369)
PMV BLD AUTO: 10.2 FL (ref 7.4–10.4)
POTASSIUM SERPL-SCNC: 4.7 MMOL/L (ref 3.5–5.1)
RBC # BLD: 4.81 M/UL (ref 3.93–5.22)
SODIUM BLD-SCNC: 139 MMOL/L (ref 137–145)
TOTAL IRON BINDING CAPACITY: 366 UG/DL (ref 265–497)
TOTAL PROTEIN: 7.1 G/DL (ref 6.3–8.2)
WBC # BLD: 7.41 K/UL (ref 3.98–10.04)

## 2020-06-09 PROCEDURE — 83540 ASSAY OF IRON: CPT

## 2020-06-09 PROCEDURE — 82728 ASSAY OF FERRITIN: CPT

## 2020-06-09 PROCEDURE — 80053 COMPREHEN METABOLIC PANEL: CPT

## 2020-06-09 PROCEDURE — 83550 IRON BINDING TEST: CPT

## 2020-06-09 PROCEDURE — 85025 COMPLETE CBC W/AUTO DIFF WBC: CPT

## 2020-06-21 NOTE — PROGRESS NOTES
menstruation- painful over the 2 cycles. Menstrual flow, duration has been the same.     She was told that she could have too much iron on board and was referred for evaluation. She does not take iron replacement therapy. She does have some significant fatigue issues. She also has tachycardia issues but has had that in the past and had it evaluated by Dr. Godfrey Buck about 7 years ago. She was previously on Toprol but does not take that anymore.     CBC 3/26/2020 revealed a WBC of 5.92 with normal percent differential, Hgb 15.7, HCT 46.1, MCV 89.5, ,000.     CMP 3/26/2020 revealed AST 49 (<32) and ALT 67 (<33) with normal crt of 0.88 with GFR 82. She reports she subsequently had an ultrasound of her liver at [facility] that revealed a fatty liver.     A prior CBC from 2019 revealed Hgb 15.6, HCT 46.9.     She had recently been evaluated by Dr. Tequila Wade as well. She reports that she has had a change in her menstruation- painful over the 2 cycles. Menstrual flow, duration has been the same.     She was told that she could have too much iron on board and was referred for evaluation. She does not take iron replacement therapy.       Initial CBC in the office on 2020 revealed a WBC of 6.38 with a normal percent differential, Hgb 12.4, MCV 92.9, MCHC 30.6 ,000. Her Hgb is not elevated whatsoever today. Her MCHC is slightly low.     Hepatic ultrasound [facility] 2020 revealed coarsening of the hepatic echotexture consistent with fatty infiltration with no focal hepatic abnormality.     Serology 4/15/2020  Serum Fe - 88  TIBC - 312  Fe sat - 28.2%  Ferritin - 143  TSH - 2.17      Serology 2020  Serum Fe - 75  TIBC - 366  Fe sat - 20%  Ferritin - 150 (6.2-137)      Past Medical History:   Diagnosis Date    Heart murmur     Kidney stones         Past Surgical History:   Procedure Laterality Date    BLADDER SURGERY  1981     SECTION      CHOLECYSTECTOMY      HYSTEROSCOPY  2017    KNEE

## 2020-06-22 ENCOUNTER — OFFICE VISIT (OUTPATIENT)
Dept: HEMATOLOGY | Age: 40
End: 2020-06-22
Payer: COMMERCIAL

## 2020-06-22 ENCOUNTER — HOSPITAL ENCOUNTER (OUTPATIENT)
Dept: INFUSION THERAPY | Age: 40
Discharge: HOME | End: 2020-06-22
Payer: COMMERCIAL

## 2020-06-22 VITALS
BODY MASS INDEX: 35.2 KG/M2 | HEIGHT: 64 IN | SYSTOLIC BLOOD PRESSURE: 124 MMHG | TEMPERATURE: 98.3 F | HEART RATE: 77 BPM | OXYGEN SATURATION: 97 % | DIASTOLIC BLOOD PRESSURE: 78 MMHG | WEIGHT: 206.2 LBS

## 2020-06-22 DIAGNOSIS — R53.83 OTHER FATIGUE: ICD-10-CM

## 2020-06-22 LAB
BASOPHILS ABSOLUTE: 0.03 K/UL (ref 0.01–0.08)
BASOPHILS RELATIVE PERCENT: 0.5 % (ref 0.1–1.2)
EOSINOPHILS ABSOLUTE: 0.24 K/UL (ref 0.04–0.54)
EOSINOPHILS RELATIVE PERCENT: 4 % (ref 0.7–7)
HCT VFR BLD CALC: 44.8 % (ref 34.1–44.9)
HEMOGLOBIN: 14.9 G/DL (ref 11.2–15.7)
LYMPHOCYTES ABSOLUTE: 2.37 K/UL (ref 1.18–3.74)
LYMPHOCYTES RELATIVE PERCENT: 39.6 % (ref 19.3–53.1)
MCH RBC QN AUTO: 31.2 PG (ref 25.6–32.2)
MCHC RBC AUTO-ENTMCNC: 33.3 G/DL (ref 32.3–35.5)
MCV RBC AUTO: 93.7 FL (ref 79.4–94.8)
MONOCYTES ABSOLUTE: 0.55 K/UL (ref 0.24–0.82)
MONOCYTES RELATIVE PERCENT: 9.2 % (ref 4.7–12.5)
NEUTROPHILS ABSOLUTE: 2.8 K/UL (ref 1.56–6.13)
NEUTROPHILS RELATIVE PERCENT: 46.7 % (ref 34–71.1)
PDW BLD-RTO: 13 % (ref 11.7–14.4)
PLATELET # BLD: 201 K/UL (ref 182–369)
PMV BLD AUTO: 11 FL (ref 7.4–10.4)
RBC # BLD: 4.78 M/UL (ref 3.93–5.22)
WBC # BLD: 5.99 K/UL (ref 3.98–10.04)

## 2020-06-22 PROCEDURE — 99213 OFFICE O/P EST LOW 20 MIN: CPT | Performed by: PHYSICIAN ASSISTANT

## 2020-06-22 PROCEDURE — 85025 COMPLETE CBC W/AUTO DIFF WBC: CPT

## 2020-06-22 PROCEDURE — 99211 OFF/OP EST MAY X REQ PHY/QHP: CPT

## 2020-06-22 ASSESSMENT — ENCOUNTER SYMPTOMS
COUGH: 0
CONSTIPATION: 0
VOMITING: 0
SHORTNESS OF BREATH: 0
TROUBLE SWALLOWING: 0
VOICE CHANGE: 0
SORE THROAT: 0
DIARRHEA: 0
ABDOMINAL PAIN: 0
PHOTOPHOBIA: 0
ABDOMINAL DISTENTION: 0
BLOOD IN STOOL: 0
EYE ITCHING: 0
NAUSEA: 0
WHEEZING: 0
BACK PAIN: 0
EYE DISCHARGE: 0
COLOR CHANGE: 0

## 2020-10-02 ENCOUNTER — HOSPITAL ENCOUNTER (OUTPATIENT)
Dept: INFUSION THERAPY | Age: 40
Discharge: HOME | End: 2020-10-02
Payer: COMMERCIAL

## 2020-10-02 DIAGNOSIS — D50.8 HYPOCHROMIC RED BLOOD CELLS: ICD-10-CM

## 2020-10-02 DIAGNOSIS — R53.83 OTHER FATIGUE: ICD-10-CM

## 2020-10-02 DIAGNOSIS — R79.89 ELEVATED FERRITIN: ICD-10-CM

## 2020-10-02 LAB
ALBUMIN SERPL-MCNC: 4.8 G/DL (ref 3.5–5.2)
ALP BLD-CCNC: 81 U/L (ref 35–104)
ALT SERPL-CCNC: 55 U/L (ref 9–52)
ANION GAP SERPL CALCULATED.3IONS-SCNC: 11 MMOL/L (ref 7–19)
AST SERPL-CCNC: 44 U/L (ref 14–36)
BASOPHILS ABSOLUTE: 0.03 K/UL (ref 0.01–0.08)
BASOPHILS RELATIVE PERCENT: 0.3 % (ref 0.1–1.2)
BILIRUB SERPL-MCNC: 1 MG/DL (ref 0.2–1.3)
BUN BLDV-MCNC: 13 MG/DL (ref 7–17)
CALCIUM SERPL-MCNC: 9.4 MG/DL (ref 8.4–10.2)
CHLORIDE BLD-SCNC: 105 MMOL/L (ref 98–111)
CO2: 23 MMOL/L (ref 22–29)
CREAT SERPL-MCNC: 0.7 MG/DL (ref 0.5–1)
EOSINOPHILS ABSOLUTE: 0.28 K/UL (ref 0.04–0.54)
EOSINOPHILS RELATIVE PERCENT: 3 % (ref 0.7–7)
FERRITIN: 175 NG/ML (ref 6.2–137)
GFR NON-AFRICAN AMERICAN: >60
GLOBULIN: 3.1 G/DL
GLUCOSE BLD-MCNC: 101 MG/DL (ref 74–106)
HCT VFR BLD CALC: 44.9 % (ref 34.1–44.9)
HEMOGLOBIN: 15.7 G/DL (ref 11.2–15.7)
IRON SATURATION: 29 % (ref 14–50)
IRON: 135 UG/DL (ref 37–170)
LYMPHOCYTES ABSOLUTE: 2.76 K/UL (ref 1.18–3.74)
LYMPHOCYTES RELATIVE PERCENT: 29.6 % (ref 19.3–53.1)
MCH RBC QN AUTO: 31.2 PG (ref 25.6–32.2)
MCHC RBC AUTO-ENTMCNC: 35 G/DL (ref 32.3–35.5)
MCV RBC AUTO: 89.1 FL (ref 79.4–94.8)
MONOCYTES ABSOLUTE: 0.89 K/UL (ref 0.24–0.82)
MONOCYTES RELATIVE PERCENT: 9.5 % (ref 4.7–12.5)
NEUTROPHILS ABSOLUTE: 5.38 K/UL (ref 1.56–6.13)
NEUTROPHILS RELATIVE PERCENT: 57.6 % (ref 34–71.1)
PDW BLD-RTO: 12.8 % (ref 11.7–14.4)
PLATELET # BLD: 181 K/UL (ref 182–369)
PMV BLD AUTO: 10.8 FL (ref 7.4–10.4)
POTASSIUM SERPL-SCNC: 4.5 MMOL/L (ref 3.5–5.1)
RBC # BLD: 5.04 M/UL (ref 3.93–5.22)
SODIUM BLD-SCNC: 139 MMOL/L (ref 137–145)
TOTAL IRON BINDING CAPACITY: 460 UG/DL (ref 265–497)
TOTAL PROTEIN: 7.9 G/DL (ref 6.3–8.2)
WBC # BLD: 9.34 K/UL (ref 3.98–10.04)

## 2020-10-02 PROCEDURE — 82728 ASSAY OF FERRITIN: CPT

## 2020-10-02 PROCEDURE — 85025 COMPLETE CBC W/AUTO DIFF WBC: CPT

## 2020-10-02 PROCEDURE — 80053 COMPREHEN METABOLIC PANEL: CPT

## 2020-10-02 PROCEDURE — 83540 ASSAY OF IRON: CPT

## 2020-10-02 PROCEDURE — 83550 IRON BINDING TEST: CPT

## 2020-10-12 NOTE — PROGRESS NOTES
Progress Note      Pt Name: Isabelle Yan  YOB: 1980  MRN: [id number]    Date of evaluation: 10/13/2020  History Obtained From:  patient, electronic medical record    CHIEF COMPLAINT:    Chief Complaint   Patient presents with    Follow-up     Elevated ferritin      Current active problems  Elevated ferritin  Hepatic steatosis    HISTORY OF PRESENT ILLNESS:    Isabelle Yan is a 38 y.o.  female seen previously in the office on 4/15/2020 due to an elevated hematocrit and mildly elevated ferritin. Elevated hematocrit was most likely due to a decrease in liquid intake during the day, she has been doing better. Her elevated ferritin is most likely reactive in nature. Her iron saturation has not been elevated. She has a history of tachycardia and was previously on Toprol but has had no recurrence and is not on any medication at this time. She does have some mild fatigue issues. She has a history of nephrolithiasis without any recurrence. 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 She has not needed that as of late. She does not smoke and has never smoked. HEMATOLOGY HISTORY:  Maxime Story was seen in initial hematology consultation on 4/15/2020 referred by GWENDOLYN Melo for evaluation of her elevated hematocrit. CBC 3/26/2020 revealed a WBC of 5.92 with normal percent differential, Hgb 15.7, HCT 46.1, MCV 89.5, ,000.       CMP 3/26/2020 revealed AST 49 (<32) and ALT 67 (<33) with normal crt of 0.88 with GFR 82. She reports she subsequently had an ultrasound of her liver at [facility] that revealed a fatty liver.     A prior CBC from 8/7/2019 revealed Hgb 15.6, HCT 46.9.     She had recently been evaluated by Dr. Nigel Mcdonald as well. She reports that she has had a change in her menstruation- painful over the 2 cycles.   Menstrual flow, duration has been the same.     She was told that she could have too much iron on board and was referred for evaluation. She does not take iron replacement therapy. She does have some significant fatigue issues. She also has tachycardia issues but has had that in the past and had it evaluated by Dr. Army Morris about 7 years ago. She was previously on Toprol but does not take that anymore.     CBC 3/26/2020 revealed a WBC of 5.92 with normal percent differential, Hgb 15.7, HCT 46.1, MCV 89.5, ,000.     CMP 3/26/2020 revealed AST 49 (<32) and ALT 67 (<33) with normal crt of 0.88 with GFR 82. She reports she subsequently had an ultrasound of her liver at [facility] that revealed a fatty liver.     A prior CBC from 2019 revealed Hgb 15.6, HCT 46.9.     She had recently been evaluated by Dr. Christina Estrada as well. She reports that she has had a change in her menstruation- painful over the 2 cycles. Menstrual flow, duration has been the same.     She was told that she could have too much iron on board and was referred for evaluation. She does not take iron replacement therapy.       Initial CBC in the office on 2020 revealed a WBC of 6.38 with a normal percent differential, Hgb 12.4, MCV 92.9, MCHC 30.6 ,000. Her Hgb is not elevated whatsoever today. Her MCHC is slightly low.     Hepatic ultrasound [facility] 2020 revealed coarsening of the hepatic echotexture consistent with fatty infiltration with no focal hepatic abnormality.     Serology 4/15/2020  Serum Fe - 88  TIBC - 312  Fe sat - 28.2%  Ferritin - 143  TSH - 2.17      Serology 2020  Serum Fe - 75  TIBC - 366  Fe sat - 20%  Ferritin - 150 (6.2-137)      Past Medical History:   Diagnosis Date    Heart murmur     Kidney stones         Past Surgical History:   Procedure Laterality Date    BLADDER SURGERY  1981     SECTION  2013    CHOLECYSTECTOMY  2011    HYSTEROSCOPY  2017   [address]    LITHOTRIPSY  2005    UPPER GASTROINTESTINAL ENDOSCOPY             Current Outpatient Medications:     hyoscyamine (ANASPAZ;LEVSIN) 125 MCG tablet, Take 125 mcg by mouth every 4 hours as needed for Cramping, Disp: , Rfl:      Allergies   Allergen Reactions    Azithromycin Hives    Sulfa Antibiotics      Other reaction(s): Nausea And Vomiting       Social History     Tobacco Use    Smoking status: Never Smoker    Smokeless tobacco: Never Used   Substance Use Topics    Alcohol use: Not Currently    Drug use: Never       Family History   Problem Relation Age of Onset    High Blood Pressure Mother     Osteoporosis Mother     High Cholesterol Mother     High Blood Pressure Father     Stroke Father     Diabetes Father     High Blood Pressure Brother     Other Maternal Grandmother         MDS    Cancer Maternal Grandmother         Skin Cancer    High Blood Pressure Maternal Grandmother     Stroke Maternal Grandmother     Stroke Paternal Grandmother     Heart Disease Paternal Grandmother     Diabetes Paternal Grandmother        Subjective   REVIEW OF SYSTEMS:   Review of Systems   Constitutional: Positive for fatigue (Moderate). Negative for chills, diaphoresis, fever and unexpected weight change. HENT: Negative for mouth sores, nosebleeds, sore throat, trouble swallowing and voice change. Eyes: Negative for photophobia, discharge and itching. Respiratory: Negative for cough, shortness of breath and wheezing. Cardiovascular: Positive for palpitations. Negative for chest pain and leg swelling. Gastrointestinal: Negative for abdominal distention, abdominal pain, blood in stool, constipation, diarrhea, nausea and vomiting. Endocrine: Negative for cold intolerance, heat intolerance, polydipsia and polyuria. Genitourinary: Positive for menstrual problem (Pain). Negative for difficulty urinating, dysuria, hematuria and urgency. Musculoskeletal: Negative for arthralgias, back pain, joint swelling and myalgias. Skin: Negative for color change and rash. Neurological: Negative for dizziness, tremors, seizures, syncope and light-headedness. Hematological: Negative for adenopathy. Does not bruise/bleed easily. Psychiatric/Behavioral: Negative for behavioral problems and suicidal ideas. The patient is not nervous/anxious. All other systems reviewed and are negative. Objective   /82   Pulse 83   Temp 97.3 °F (36.3 °C) (Temporal)   Ht 5' 4\" (1.626 m)   Wt 201 lb 9.6 oz (91.4 kg)   SpO2 98%   BMI 34.60 kg/m²     PHYSICAL EXAM:  Physical Exam  Constitutional:       Appearance: She is well-developed. HENT:      Head: Normocephalic and atraumatic. Eyes:      General: No scleral icterus. Conjunctiva/sclera: Conjunctivae normal.   Neck:      Musculoskeletal: Normal range of motion and neck supple. Trachea: No tracheal deviation. Cardiovascular:      Rate and Rhythm: Normal rate and regular rhythm. Heart sounds: Normal heart sounds. No murmur. Pulmonary:      Effort: Pulmonary effort is normal. No respiratory distress. Breath sounds: Normal breath sounds. Abdominal:      General: Bowel sounds are normal. There is no distension. Palpations: Abdomen is soft. Tenderness: There is no abdominal tenderness. Musculoskeletal:         General: No tenderness. Comments: Full ROM in all 4 extremities   Skin:     General: Skin is warm and dry. Findings: No rash. Neurological:      Mental Status: She is alert and oriented to person, place, and time. Coordination: Coordination normal.   Psychiatric:         Behavior: Behavior normal.         Thought Content: Thought content normal.           VISIT DIAGNOSES  1. Elevated ferritin    2. Hepatic steatosis        ASSESSMENT/PLAN:      Hepatic ultrasound [facility] 4/1/2020 revealed coarsening of the hepatic echotexture consistent with fatty infiltration with no focal hepatic abnormality.     Serology 4/15/2020  Serum Fe - 88  TIBC - 312  Fe sat - 28.2%  Ferritin - 143  TSH - 2.17      Serology 6/9/2020  Serum Fe - 75  TIBC - 366  Fe sat - 20%  Ferritin - 150 (6.2-137)    Serology 10/2/2020  Serum Fe - 135  TIBC - 460  Fe sat - 29%  Ferritin - 175  CMP -AST 44, ALT 55     Overall ferritin continues to be fairly stable- Only minimally elevated. .  Saturation is not increased above 45%-which is needed for insurance to pay for hereditary hemochromatosis panel. Iron saturation is within normal limits. AST and ALT are mildly elevated-ultrasound of the liver that was performed on 4/1/2020 at [facility] revealed a fatty liver infiltration. Follow-up will be changed to every 6 months    3.. Cervical cancer screening. Pap annually per Dr. Erin Zhang    4. Breast cancer screening    Bilateral screening mammogram at [address] [place] [address] 5/11/2020 was BI-RADS 1        Orders Placed This Encounter   Procedures    Iron and TIBC    Ferritin    Comprehensive Metabolic Panel        Return in about 6 months (around 4/13/2021) for With Marilin Smith.      Jovon Prajapati PA-C  8:14 AM  10/13/2020

## 2020-10-13 ENCOUNTER — OFFICE VISIT (OUTPATIENT)
Dept: HEMATOLOGY | Age: 40
End: 2020-10-13
Payer: COMMERCIAL

## 2020-10-13 ENCOUNTER — HOSPITAL ENCOUNTER (OUTPATIENT)
Dept: INFUSION THERAPY | Age: 40
Discharge: HOME | End: 2020-10-13
Payer: COMMERCIAL

## 2020-10-13 VITALS
HEIGHT: 64 IN | TEMPERATURE: 97.3 F | OXYGEN SATURATION: 98 % | WEIGHT: 201.6 LBS | HEART RATE: 83 BPM | DIASTOLIC BLOOD PRESSURE: 82 MMHG | BODY MASS INDEX: 34.42 KG/M2 | SYSTOLIC BLOOD PRESSURE: 126 MMHG

## 2020-10-13 DIAGNOSIS — R53.83 OTHER FATIGUE: ICD-10-CM

## 2020-10-13 LAB
BASOPHILS ABSOLUTE: 0.03 K/UL (ref 0.01–0.08)
BASOPHILS RELATIVE PERCENT: 0.5 % (ref 0.1–1.2)
EOSINOPHILS ABSOLUTE: 0.27 K/UL (ref 0.04–0.54)
EOSINOPHILS RELATIVE PERCENT: 4.1 % (ref 0.7–7)
HCT VFR BLD CALC: 47.4 % (ref 34.1–44.9)
HEMOGLOBIN: 15.5 G/DL (ref 11.2–15.7)
LYMPHOCYTES ABSOLUTE: 2.55 K/UL (ref 1.18–3.74)
LYMPHOCYTES RELATIVE PERCENT: 38.3 % (ref 19.3–53.1)
MCH RBC QN AUTO: 31.3 PG (ref 25.6–32.2)
MCHC RBC AUTO-ENTMCNC: 32.7 G/DL (ref 32.3–35.5)
MCV RBC AUTO: 95.8 FL (ref 79.4–94.8)
MONOCYTES ABSOLUTE: 0.74 K/UL (ref 0.24–0.82)
MONOCYTES RELATIVE PERCENT: 11.1 % (ref 4.7–12.5)
NEUTROPHILS ABSOLUTE: 3.06 K/UL (ref 1.56–6.13)
NEUTROPHILS RELATIVE PERCENT: 46 % (ref 34–71.1)
PDW BLD-RTO: 13.1 % (ref 11.7–14.4)
PLATELET # BLD: 207 K/UL (ref 182–369)
PMV BLD AUTO: 10.8 FL (ref 7.4–10.4)
RBC # BLD: 4.95 M/UL (ref 3.93–5.22)
WBC # BLD: 6.65 K/UL (ref 3.98–10.04)

## 2020-10-13 PROCEDURE — 99213 OFFICE O/P EST LOW 20 MIN: CPT | Performed by: PHYSICIAN ASSISTANT

## 2020-10-13 PROCEDURE — 99211 OFF/OP EST MAY X REQ PHY/QHP: CPT

## 2020-10-13 PROCEDURE — 85025 COMPLETE CBC W/AUTO DIFF WBC: CPT

## 2020-10-13 ASSESSMENT — ENCOUNTER SYMPTOMS
ABDOMINAL PAIN: 0
EYE ITCHING: 0
PHOTOPHOBIA: 0
EYE DISCHARGE: 0
CONSTIPATION: 0
DIARRHEA: 0
WHEEZING: 0
BLOOD IN STOOL: 0
COLOR CHANGE: 0
BACK PAIN: 0
COUGH: 0
NAUSEA: 0
SORE THROAT: 0
VOICE CHANGE: 0
TROUBLE SWALLOWING: 0
VOMITING: 0
SHORTNESS OF BREATH: 0
ABDOMINAL DISTENTION: 0

## 2021-04-13 ENCOUNTER — HOSPITAL ENCOUNTER (OUTPATIENT)
Dept: INFUSION THERAPY | Age: 41
Discharge: HOME | End: 2021-04-13
Payer: COMMERCIAL

## 2021-04-26 NOTE — PROGRESS NOTES
Progress Note      Pt Name: Hodan Cleveland  YOB: 1980  MRN: [id number]    Date of evaluation: 4/27/2021  History Obtained From:  patient, electronic medical record    CHIEF COMPLAINT:    Chief Complaint   Patient presents with    Follow-up     Elevated ferritin     Current active problems  Elevated ferritin  Hepatic steatosis    HISTORY OF PRESENT ILLNESS:    Hodan Cleveland is a 38 y.o.  female seen previously in the office on 4/15/2020 due to an elevated hematocrit and mildly elevated ferritin. Elevated hematocrit was most likely due to a decrease in liquid intake during the day, she has been doing better. Her elevated ferritin is most likely reactive in nature. Her iron saturation was well within normal range. Ultrasound of the liver has revealed fatty infiltration. LFTs have not been significantly abnormal.    She did have labs drawn last week. She reports that she has had pain and discomfort in her left foot and ankle since January. She does not recall any injury. She is wearing a walking boot, and is under evaluation by the orthopedic [place]. She had a steroid injection into the joint yesterday. She reports that she has a long history of foot and ankle problems dating back to when she was very young. She has a history of a fracture of her second metatarsal of her right footpossibly traumatic from getting in a pool. However a prior bone density did show osteopenia. Her blood pressure is running higher given the discomfort with her left foot. She is also on Voltaren gel. She continues to use Levsin as needed because of diagnosis of spasm of the sphincter of Oddi. HEMATOLOGY HISTORY:  Zamzam Bar was seen in initial hematology consultation on 4/15/2020 referred by CRUZ Cox for evaluation of her elevated hematocrit.     CBC 3/26/2020 revealed a WBC of 5.92 with normal percent differential, Hgb 15.7, HCT 46.1, MCV 89.5, ,000.       CMP 3/26/2020 revealed AST 49 (<32) and ALT 67 (<33) with normal crt of 0.88 with GFR 82. She reports she subsequently had an ultrasound of her liver at [facility] that revealed a fatty liver.     A prior CBC from 8/7/2019 revealed Hgb 15.6, HCT 46.9.     She had recently been evaluated by Dr. Nixon Perea as well. She reports that she has had a change in her menstruation painful over the 2 cycles. Menstrual flow, duration has been the same.     She was told that she could have too much iron on board and was referred for evaluation. She does not take iron replacement therapy. She does have some significant fatigue issues. She also has tachycardia issues but has had that in the past and had it evaluated by Dr. Dereck Manzanares about 7 years ago. She was previously on Toprol but does not take that anymore.     CBC 3/26/2020 revealed a WBC of 5.92 with normal percent differential, Hgb 15.7, HCT 46.1, MCV 89.5, ,000.     CMP 3/26/2020 revealed AST 49 (<32) and ALT 67 (<33) with normal crt of 0.88 with GFR 82. She reports she subsequently had an ultrasound of her liver at [facility] that revealed a fatty liver.     A prior CBC from 8/7/2019 revealed Hgb 15.6, HCT 46.9.     She had recently been evaluated by Dr. Nixon Perea as well. She reports that she has had a change in her menstruation painful over the 2 cycles. Menstrual flow, duration has been the same.     She was told that she could have too much iron on board and was referred for evaluation. She does not take iron replacement therapy.       Initial CBC in the office on 4/18/2020 revealed a WBC of 6.38 with a normal percent differential, Hgb 12.4, MCV 92.9, MCHC 30.6 ,000. Her Hgb is not elevated whatsoever today. Her MCHC is slightly low.     Hepatic ultrasound [facility] 4/1/2020 revealed coarsening of the hepatic echotexture consistent with fatty infiltration with no focal hepatic abnormality.     Serology 4/15/2020  Serum Fe - 88  TIBC - 312  Fe sat - 28.2%  Ferritin - 143  TSH - 2.17      Serology 2020  Serum Fe - 75  TIBC - 366  Fe sat - 20%  Ferritin - 150 (6.2-137)    Serology 10/2/2020  Serum Fe - 135  TIBC - 460  Fe sat - 29%  Ferritin - 175  CMP -AST 44, ALT 55     Overall ferritin continues to be fairly stable- Only minimally elevated. .  Saturation was not increased above 45%which is needed for insurance to pay for hereditary hemochromatosis panel. Iron saturation is within normal limits. AST and ALT are mildly elevatedultrasound of the liver that was performed on 2020 at [facility] revealed a fatty liver infiltration.   Follow-up will be changed to every 6 months        Past Medical History:   Diagnosis Date    Heart murmur     Kidney stones         Past Surgical History:   Procedure Laterality Date    BLADDER SURGERY       SECTION      CHOLECYSTECTOMY      [address]     [address]    LITHOTRIPSY      UPPER GASTROINTESTINAL ENDOSCOPY             Current Outpatient Medications:     diclofenac (VOLTAREN) 25 MG EC tablet, Take 25 mg by mouth 2 times daily, Disp: , Rfl:     hyoscyamine (ANASPAZ;LEVSIN) 125 MCG tablet, Take 125 mcg by mouth every 4 hours as needed for Cramping, Disp: , Rfl:      Allergies   Allergen Reactions    Azithromycin Hives    Sulfa Antibiotics      Other reaction(s): Nausea And Vomiting       Social History     Tobacco Use    Smoking status: Never Smoker    Smokeless tobacco: Never Used   Substance Use Topics    Alcohol use: Not Currently    Drug use: Never       Family History   Problem Relation Age of Onset    High Blood Pressure Mother     Osteoporosis Mother     High Cholesterol Mother     High Blood Pressure Father     Stroke Father     Diabetes Father     High Blood Pressure Brother     Other Maternal Grandmother         MDS    Cancer Maternal Grandmother         Skin Cancer    High Blood Pressure Maternal Grandmother     Stroke Maternal Grandmother     Stroke Paternal Grandmother     Heart Disease Paternal Grandmother     Diabetes Paternal Grandmother        Subjective   REVIEW OF SYSTEMS:   Review of Systems   Constitutional: Positive for fatigue (Mild). Negative for chills, diaphoresis, fever and unexpected weight change. HENT: Negative for mouth sores, nosebleeds, sore throat, trouble swallowing and voice change. Eyes: Negative for photophobia, discharge and itching. Respiratory: Negative for cough, shortness of breath and wheezing. Cardiovascular: Positive for palpitations (Rare). Negative for chest pain and leg swelling. Gastrointestinal: Negative for abdominal distention, abdominal pain, blood in stool, constipation, diarrhea, nausea and vomiting. Endocrine: Negative for cold intolerance, heat intolerance, polydipsia and polyuria. Genitourinary: Negative for difficulty urinating, dysuria, hematuria, menstrual problem and urgency. Musculoskeletal: Positive for arthralgias (Left ankle). Negative for back pain, joint swelling and myalgias. Skin: Negative for color change and rash. Neurological: Negative for dizziness, tremors, seizures, syncope and light-headedness. Hematological: Negative for adenopathy. Does not bruise/bleed easily. Psychiatric/Behavioral: Negative for behavioral problems and suicidal ideas. The patient is not nervous/anxious. All other systems reviewed and are negative. Objective   BP (!) 138/90   Pulse 88   Temp 97.5 °F (36.4 °C) (Temporal)   Ht 5' 4\" (1.626 m)   Wt 208 lb 3.2 oz (94.4 kg)   SpO2 96%   BMI 35.74 kg/m²     PHYSICAL EXAM:  Physical Exam  Constitutional:       Appearance: She is well-developed. HENT:      Head: Normocephalic and atraumatic. Eyes:      General: No scleral icterus. Conjunctiva/sclera: Conjunctivae normal.   Neck:      Musculoskeletal: Normal range of motion and neck supple. Trachea: No tracheal deviation.    Cardiovascular:      Rate and Rhythm: Normal rate and regular rhythm. Heart sounds: Normal heart sounds. No murmur. Pulmonary:      Effort: Pulmonary effort is normal. No respiratory distress. Breath sounds: Normal breath sounds. Abdominal:      General: Bowel sounds are normal. There is no distension. Palpations: Abdomen is soft. Tenderness: There is no abdominal tenderness. Musculoskeletal:         General: Deformity (Walking boot left ankle) present. No tenderness. Comments: Full ROM in all 4 extremities   Skin:     General: Skin is warm and dry. Findings: No rash. Neurological:      Mental Status: She is alert and oriented to person, place, and time. Coordination: Coordination normal.   Psychiatric:         Behavior: Behavior normal.         Thought Content: Thought content normal.         CBC reviewed by me  Lab Results   Component Value Date    WBC 17.60 (H) 04/27/2021    HGB 14.2 04/27/2021    HCT 43.6 04/27/2021    MCV 92.0 04/27/2021     (L) 04/27/2021     Lab Results   Component Value Date    NEUTROABS 13.35 (H) 04/27/2021       VISIT DIAGNOSES  1. Elevated ferritin    2. Hepatic steatosis    3. Chronic pain of left ankle    4. Acute left ankle pain        ASSESSMENT/PLAN:    1. Elevated ferritin and iron saturation        Labs above from 4/13/2021 reveals that her ferritin has increased slightly. Iron saturation also is up to 44%. LFTs only reveal a minimal elevation of ALT at 37 (0-32)    Ferritin can be more elevated due to acute phase reactant given her left ankle. However her iron saturation is up as well. I discussed this with her. PLAN  Hemochromatosis panel    2. Leukocytosis    WBC today is 17.6 with 75.9% PMN. This could be secondary to her issue with her left ankle, as well as the cortisone injection she had yesterday. This will be rechecked at her next visit hold on serology other than CRP. 3.  Thrombocytopenia. PLT is decreased to 131,000. PLT was over 200,000 last visit.   I will recheck status at our next visit but hold on serology for now    4. Cervical cancer screening. Pap annually per Dr. Qian Draper    5. Breast cancer screening    Bilateral screening mammogram at [facility] 5/11/2020 was BI-RADS 1      Immunization    She has not had COVID-19. She has no desire to get COVID-19 immunization. Orders Placed This Encounter   Procedures    Miscellaneous Sendout 2    C-Reactive Protein    Uric Acid        Return in about 1 month (around 5/27/2021).      Candelario Welch PA-C  10:18 AM  4/27/2021

## 2021-04-27 ENCOUNTER — HOSPITAL ENCOUNTER (OUTPATIENT)
Dept: INFUSION THERAPY | Age: 41
Discharge: HOME | End: 2021-04-27
Payer: COMMERCIAL

## 2021-04-27 ENCOUNTER — OFFICE VISIT (OUTPATIENT)
Dept: HEMATOLOGY | Age: 41
End: 2021-04-27
Payer: MEDICAID

## 2021-04-27 VITALS
HEART RATE: 88 BPM | SYSTOLIC BLOOD PRESSURE: 138 MMHG | OXYGEN SATURATION: 96 % | HEIGHT: 64 IN | WEIGHT: 208.2 LBS | TEMPERATURE: 97.5 F | BODY MASS INDEX: 35.55 KG/M2 | DIASTOLIC BLOOD PRESSURE: 90 MMHG

## 2021-04-27 DIAGNOSIS — R53.83 OTHER FATIGUE: ICD-10-CM

## 2021-04-27 DIAGNOSIS — G89.29 CHRONIC PAIN OF LEFT ANKLE: ICD-10-CM

## 2021-04-27 DIAGNOSIS — M25.572 CHRONIC PAIN OF LEFT ANKLE: ICD-10-CM

## 2021-04-27 DIAGNOSIS — R79.89 ELEVATED FERRITIN: Primary | ICD-10-CM

## 2021-04-27 DIAGNOSIS — M25.572 ACUTE LEFT ANKLE PAIN: ICD-10-CM

## 2021-04-27 DIAGNOSIS — K76.0 HEPATIC STEATOSIS: ICD-10-CM

## 2021-04-27 LAB
BASOPHILS ABSOLUTE: 0.04 K/UL (ref 0.01–0.08)
BASOPHILS RELATIVE PERCENT: 0.2 % (ref 0.1–1.2)
EOSINOPHILS ABSOLUTE: 0.03 K/UL (ref 0.04–0.54)
EOSINOPHILS RELATIVE PERCENT: 0.2 % (ref 0.7–7)
HCT VFR BLD CALC: 43.6 % (ref 34.1–44.9)
HEMOGLOBIN: 14.2 G/DL (ref 11.2–15.7)
LYMPHOCYTES ABSOLUTE: 2.84 K/UL (ref 1.18–3.74)
LYMPHOCYTES RELATIVE PERCENT: 16.1 % (ref 19.3–53.1)
MCH RBC QN AUTO: 30 PG (ref 25.6–32.2)
MCHC RBC AUTO-ENTMCNC: 32.6 G/DL (ref 32.3–35.5)
MCV RBC AUTO: 92 FL (ref 79.4–94.8)
MONOCYTES ABSOLUTE: 1.34 K/UL (ref 0.24–0.82)
MONOCYTES RELATIVE PERCENT: 7.6 % (ref 4.7–12.5)
NEUTROPHILS ABSOLUTE: 13.35 K/UL (ref 1.56–6.13)
NEUTROPHILS RELATIVE PERCENT: 75.9 % (ref 34–71.1)
PDW BLD-RTO: 13 % (ref 11.7–14.4)
PLATELET # BLD: 131 K/UL (ref 182–369)
PMV BLD AUTO: 11.6 FL (ref 7.4–10.4)
RBC # BLD: 4.74 M/UL (ref 3.93–5.22)
URIC ACID, SERUM: 2.8 MG/DL (ref 2.5–5.2)
WBC # BLD: 17.6 K/UL (ref 3.98–10.04)

## 2021-04-27 PROCEDURE — 99213 OFFICE O/P EST LOW 20 MIN: CPT | Performed by: PHYSICIAN ASSISTANT

## 2021-04-27 PROCEDURE — 85025 COMPLETE CBC W/AUTO DIFF WBC: CPT

## 2021-04-27 PROCEDURE — 84550 ASSAY OF BLOOD/URIC ACID: CPT

## 2021-04-27 PROCEDURE — 99212 OFFICE O/P EST SF 10 MIN: CPT

## 2021-04-27 RX ORDER — DICLOFENAC SODIUM 25 MG/1
25 TABLET, DELAYED RELEASE ORAL 2 TIMES DAILY
COMMUNITY
End: 2021-05-25

## 2021-04-27 ASSESSMENT — ENCOUNTER SYMPTOMS
ABDOMINAL PAIN: 0
SHORTNESS OF BREATH: 0
VOMITING: 0
EYE ITCHING: 0
COLOR CHANGE: 0
ABDOMINAL DISTENTION: 0
COUGH: 0
BLOOD IN STOOL: 0
NAUSEA: 0
EYE DISCHARGE: 0
WHEEZING: 0
BACK PAIN: 0
SORE THROAT: 0
DIARRHEA: 0
PHOTOPHOBIA: 0
VOICE CHANGE: 0
TROUBLE SWALLOWING: 0
CONSTIPATION: 0

## 2021-05-22 NOTE — PROGRESS NOTES
Progress Note      Pt Name: Adam Miner  YOB: 1980  MRN: [id number]    Date of evaluation: 5/25/2021  History Obtained From:  patient, electronic medical record    CHIEF COMPLAINT:    Chief Complaint   Patient presents with    Follow-up     Elevated ferritin     Current active problems  Heterozygous H63D hereditary hemochromatosis  Hepatic steatosis    HISTORY OF PRESENT ILLNESS:    Adam Miner is a 38 y.o.  female seen previously in the office on 4/15/2020 due to an elevated hematocrit and mildly elevated ferritin. Elevated hematocrit was most likely due to a decrease in liquid intake during the day, she has been doing better. Work-up has revealed heterozygous H63D hereditary hemochromatosis. She continues to have issues with her left ankle. She may have a tendon injury. She is followed at the [facility]opedic group by Dr. Lily Torres and is to get an MRI. She is back in a walking cast.  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 She is no longer taking the Voltaren. HEMATOLOGY HISTORY: Heterozygous H63D hereditary hemochromatosis  Jessica Daniel was seen in initial hematology consultation on 4/15/2020 referred by CRUZ Clark for evaluation of her elevated hematocrit. CBC 3/26/2020 revealed a WBC of 5.92 with normal percent differential, Hgb 15.7, HCT 46.1, MCV 89.5, ,000.       CMP 3/26/2020 revealed AST 49 (<32) and ALT 67 (<33) with normal crt of 0.88 with GFR 82. She reports she subsequently had an ultrasound of her liver at [facility] that revealed a fatty liver.     A prior CBC from 8/7/2019 revealed Hgb 15.6, HCT 46.9.     She had recently been evaluated by Dr. Joe Tucker as well. She reports that she has had a change in her menstruation painful over the 2 cycles.   Menstrual flow, duration has been the same.     She was told that she could have too much iron on board and was referred for evaluation. She does not take iron replacement therapy. She does have some significant fatigue issues. She also has tachycardia issues but has had that in the past and had it evaluated by Dr. Leona Szymanski about 7 years ago. She was previously on Toprol but does not take that anymore.     CBC 3/26/2020 revealed a WBC of 5.92 with normal percent differential, Hgb 15.7, HCT 46.1, MCV 89.5, ,000.     CMP 3/26/2020 revealed AST 49 (<32) and ALT 67 (<33) with normal crt of 0.88 with GFR 82. She reports she subsequently had an ultrasound of her liver at [facility] that revealed a fatty liver.     A prior CBC from 8/7/2019 revealed Hgb 15.6, HCT 46.9.     She had recently been evaluated by Dr. Pernell Dominguez as well. She reports that she has had a change in her menstruation painful over the 2 cycles. Menstrual flow, duration has been the same.     She was told that she could have too much iron on board and was referred for evaluation. She does not take iron replacement therapy.       Initial CBC in the office on 4/18/2020 revealed a WBC of 6.38 with a normal percent differential, Hgb 12.4, MCV 92.9, MCHC 30.6 ,000. Her Hgb is not elevated whatsoever today. Her MCHC is slightly low.     Hepatic ultrasound [facility] 4/1/2020 revealed coarsening of the hepatic echotexture consistent with fatty infiltration with no focal hepatic abnormality. Serology 4/15/2020  Serum Fe - 88  TIBC - 312  Fe sat - 28.2%  Ferritin - 143  TSH - 2.17      Serology 6/9/2020  Serum Fe - 75  TIBC - 366  Fe sat - 20%  Ferritin - 150 (6.2-137)    Serology 10/2/2020  Serum Fe - 135  TIBC - 460  Fe sat - 29%  Ferritin - 175  CMP -AST 44, ALT 55     Labs from 4/13/2021 reveals that her ferritin has increased slightly. Iron saturation also is up to 44%. LFTs only reveal a minimal elevation of ALT at 37 (0-32)    Hemochromatosis panel 4/27/2021 -heterozygous H63D mutation      TREATMENT SUMMARY  1.  Therapeutic phlebotomy 250 cc 2021        Past Medical History:   Diagnosis Date    Heart murmur     Kidney stones         Past Surgical History:   Procedure Laterality Date    BLADDER SURGERY  1981     SECTION  2013    CHOLECYSTECTOMY  2011    HYSTEROSCOPY  2017   [address]    LITHOTRIPSY      UPPER GASTROINTESTINAL ENDOSCOPY             Current Outpatient Medications:     hyoscyamine (ANASPAZ;LEVSIN) 125 MCG tablet, Take 125 mcg by mouth every 4 hours as needed for Cramping, Disp: , Rfl:      Allergies   Allergen Reactions    Azithromycin Hives    Sulfa Antibiotics      Other reaction(s): Nausea And Vomiting       Social History     Tobacco Use    Smoking status: Never Smoker    Smokeless tobacco: Never Used   Substance Use Topics    Alcohol use: Not Currently    Drug use: Never       Family History   Problem Relation Age of Onset    High Blood Pressure Mother     Osteoporosis Mother     High Cholesterol Mother     High Blood Pressure Father     Stroke Father     Diabetes Father     High Blood Pressure Brother     Other Maternal Grandmother         MDS    Cancer Maternal Grandmother         Skin Cancer    High Blood Pressure Maternal Grandmother     Stroke Maternal Grandmother     Stroke Paternal Grandmother     Heart Disease Paternal Grandmother     Diabetes Paternal Grandmother        Subjective   REVIEW OF SYSTEMS:   Review of Systems   Constitutional: Positive for fatigue (Mild). Negative for chills, diaphoresis, fever and unexpected weight change. HENT: Negative for mouth sores, nosebleeds, sore throat, trouble swallowing and voice change. Eyes: Negative for photophobia, discharge and itching. Respiratory: Negative for cough, shortness of breath and wheezing. Cardiovascular: Negative for chest pain, palpitations and leg swelling. Gastrointestinal: Negative for abdominal distention, abdominal pain, blood in stool, constipation, diarrhea, nausea and vomiting. Endocrine: Negative for cold intolerance, heat intolerance, polydipsia and polyuria. Genitourinary: Negative for difficulty urinating, dysuria, hematuria, menstrual problem and urgency. Musculoskeletal: Positive for arthralgias (Left ankle). Negative for back pain, joint swelling and myalgias. Skin: Negative for color change and rash. Neurological: Negative for dizziness, tremors, seizures, syncope and light-headedness. Hematological: Negative for adenopathy. Does not bruise/bleed easily. Psychiatric/Behavioral: Negative for behavioral problems and suicidal ideas. The patient is not nervous/anxious. All other systems reviewed and are negative. Objective   /76   Pulse 82   Temp 97.1 °F (36.2 °C)   Ht 5' 4\" (1.626 m)   Wt 204 lb 6.4 oz (92.7 kg)   SpO2 97%   BMI 35.09 kg/m²     PHYSICAL EXAM:  Physical Exam  Constitutional:       Appearance: She is well-developed. HENT:      Head: Normocephalic and atraumatic. Eyes:      General: No scleral icterus. Conjunctiva/sclera: Conjunctivae normal.   Neck:      Trachea: No tracheal deviation. Cardiovascular:      Rate and Rhythm: Normal rate and regular rhythm. Heart sounds: Normal heart sounds. No murmur heard. Pulmonary:      Effort: Pulmonary effort is normal. No respiratory distress. Breath sounds: Normal breath sounds. Abdominal:      General: Bowel sounds are normal. There is no distension. Palpations: Abdomen is soft. Tenderness: There is no abdominal tenderness. Musculoskeletal:         General: Deformity (Walking boot left ankle) present. No tenderness. Cervical back: Normal range of motion and neck supple. Skin:     General: Skin is warm and dry. Findings: No rash. Neurological:      Mental Status: She is alert and oriented to person, place, and time. Coordination: Coordination normal.   Psychiatric:         Behavior: Behavior normal.         Thought Content:  Thought content normal.         CBC reviewed by me  Lab Results   Component Value Date    WBC 7.45 05/25/2021    HGB 15.0 05/25/2021    HCT 46.6 (H) 05/25/2021    MCV 92.6 05/25/2021     (L) 05/25/2021     Lab Results   Component Value Date    NEUTROABS 3.69 05/25/2021       VISIT DIAGNOSES  1. Hereditary hemochromatosis ([address]    2. Leukocytosis, unspecified type    3. Thrombocytopenia (HCC)        ASSESSMENT/PLAN:    1. Heterozygous H63D Hereditary hemochromatosis - new finding requiring treatment and further work-up    Hepatic ultrasound [facility] 4/1/2020 revealed coarsening of the hepatic echotexture consistent with fatty infiltration with no focal hepatic abnormality. Labs above from 4/13/2021 reveals that her ferritin has increased slightly. Iron saturation also is up to 44%. LFTs only reveal a minimal elevation of ALT at 37 (0-32)    Hemochromatosis panel 4/27/2021 -heterozygous H63D mutation    I discussed her abnormality and the fact that typically this would mean she is a carrier and would not need to be treated. However, she has had elevated LFTs in the past with a fatty liver. All questions were answered to her satisfaction. She does not have any cardiac symptoms whatsoever. PLAN  250 cc therapeutic phlebotomy today  Ultrasound abdomen to evaluate liver and spleen at [facility]  Repeat iron panel ferritin and baseline AFP today. 2.  Leukocytosis      WBC was 17.6 with 75.9% PMN when she was here on 4/27/2021. Today her WBC has normalized at 7.45. Prior elevation was most likely related to cortisone injection that she received the day before. 3.  Thrombocytopenia. PLT has improved up to 168,000. It is not completely normal as of yet. · Cervical cancer screening. Pap annually per Dr. Lily Grande    · Breast cancer screening. Bilateral screening mammogram at [address] [place] [address] 5/24/2021 was BI-RADS 1      Immunization    She has not had COVID-19.   She has no desire to get COVID-19 immunization. Orders Placed This Encounter   Procedures    US ABDOMEN COMPLETE    Comprehensive Metabolic Panel    Iron and TIBC    Ferritin    AFP Tumor Marker        Return in about 3 months (around 8/25/2021) for With Tong Mercedes.      Mile Kate PA-C  12:42 PM  5/25/2021

## 2021-05-25 ENCOUNTER — HOSPITAL ENCOUNTER (OUTPATIENT)
Dept: INFUSION THERAPY | Age: 41
Discharge: HOME | End: 2021-05-25
Payer: COMMERCIAL

## 2021-05-25 ENCOUNTER — OFFICE VISIT (OUTPATIENT)
Dept: HEMATOLOGY | Age: 41
End: 2021-05-25
Payer: MEDICAID

## 2021-05-25 VITALS
HEART RATE: 82 BPM | DIASTOLIC BLOOD PRESSURE: 76 MMHG | BODY MASS INDEX: 34.89 KG/M2 | HEIGHT: 64 IN | SYSTOLIC BLOOD PRESSURE: 122 MMHG | WEIGHT: 204.4 LBS | TEMPERATURE: 97.1 F | OXYGEN SATURATION: 97 %

## 2021-05-25 DIAGNOSIS — D72.829 LEUKOCYTOSIS, UNSPECIFIED TYPE: ICD-10-CM

## 2021-05-25 DIAGNOSIS — E83.110 HEREDITARY HEMOCHROMATOSIS (HCC): Primary | ICD-10-CM

## 2021-05-25 DIAGNOSIS — E83.110 HEREDITARY HEMOCHROMATOSIS (HCC): ICD-10-CM

## 2021-05-25 DIAGNOSIS — D69.6 THROMBOCYTOPENIA (HCC): ICD-10-CM

## 2021-05-25 DIAGNOSIS — R53.83 OTHER FATIGUE: ICD-10-CM

## 2021-05-25 LAB
ALBUMIN SERPL-MCNC: 4.3 G/DL (ref 3.5–5.2)
ALP BLD-CCNC: 76 U/L (ref 35–104)
ALT SERPL-CCNC: 40 U/L (ref 9–52)
ANION GAP SERPL CALCULATED.3IONS-SCNC: 11 MMOL/L (ref 7–19)
AST SERPL-CCNC: 31 U/L (ref 14–36)
BASOPHILS ABSOLUTE: 0.06 K/UL (ref 0.01–0.08)
BASOPHILS RELATIVE PERCENT: 0.8 % (ref 0.1–1.2)
BILIRUB SERPL-MCNC: 0.7 MG/DL (ref 0.2–1.3)
BUN BLDV-MCNC: 12 MG/DL (ref 7–17)
CALCIUM SERPL-MCNC: 9.2 MG/DL (ref 8.4–10.2)
CHLORIDE BLD-SCNC: 105 MMOL/L (ref 98–111)
CO2: 25 MMOL/L (ref 22–29)
CREAT SERPL-MCNC: 0.6 MG/DL (ref 0.5–1)
EOSINOPHILS ABSOLUTE: 0.28 K/UL (ref 0.04–0.54)
EOSINOPHILS RELATIVE PERCENT: 3.8 % (ref 0.7–7)
FERRITIN: 95 NG/ML (ref 6.2–137)
GFR NON-AFRICAN AMERICAN: >60
GLUCOSE BLD-MCNC: 98 MG/DL (ref 74–106)
HCT VFR BLD CALC: 46.6 % (ref 34.1–44.9)
HEMOGLOBIN: 15 G/DL (ref 11.2–15.7)
IRON SATURATION: 31 % (ref 14–50)
IRON: 96 UG/DL (ref 37–170)
LYMPHOCYTES ABSOLUTE: 2.71 K/UL (ref 1.18–3.74)
LYMPHOCYTES RELATIVE PERCENT: 36.4 % (ref 19.3–53.1)
MCH RBC QN AUTO: 29.8 PG (ref 25.6–32.2)
MCHC RBC AUTO-ENTMCNC: 32.2 G/DL (ref 32.3–35.5)
MCV RBC AUTO: 92.6 FL (ref 79.4–94.8)
MONOCYTES ABSOLUTE: 0.71 K/UL (ref 0.24–0.82)
MONOCYTES RELATIVE PERCENT: 9.5 % (ref 4.7–12.5)
NEUTROPHILS ABSOLUTE: 3.69 K/UL (ref 1.56–6.13)
NEUTROPHILS RELATIVE PERCENT: 49.5 % (ref 34–71.1)
PDW BLD-RTO: 12.9 % (ref 11.7–14.4)
PLATELET # BLD: 168 K/UL (ref 182–369)
PMV BLD AUTO: 11.1 FL (ref 7.4–10.4)
POTASSIUM SERPL-SCNC: 4 MMOL/L (ref 3.5–5.1)
RBC # BLD: 5.03 M/UL (ref 3.93–5.22)
SODIUM BLD-SCNC: 141 MMOL/L (ref 137–145)
TOTAL IRON BINDING CAPACITY: 312 UG/DL (ref 265–497)
TOTAL PROTEIN: 7.3 G/DL (ref 6.3–8.2)
WBC # BLD: 7.45 K/UL (ref 3.98–10.04)

## 2021-05-25 PROCEDURE — 83540 ASSAY OF IRON: CPT

## 2021-05-25 PROCEDURE — 99214 OFFICE O/P EST MOD 30 MIN: CPT | Performed by: PHYSICIAN ASSISTANT

## 2021-05-25 PROCEDURE — 82728 ASSAY OF FERRITIN: CPT

## 2021-05-25 PROCEDURE — 80053 COMPREHEN METABOLIC PANEL: CPT

## 2021-05-25 PROCEDURE — 85025 COMPLETE CBC W/AUTO DIFF WBC: CPT

## 2021-05-25 PROCEDURE — 99195 PHLEBOTOMY: CPT

## 2021-05-25 PROCEDURE — 99212 OFFICE O/P EST SF 10 MIN: CPT

## 2021-05-25 PROCEDURE — 83550 IRON BINDING TEST: CPT

## 2021-05-25 ASSESSMENT — ENCOUNTER SYMPTOMS
COLOR CHANGE: 0
CONSTIPATION: 0
ABDOMINAL DISTENTION: 0
EYE ITCHING: 0
ABDOMINAL PAIN: 0
DIARRHEA: 0
BLOOD IN STOOL: 0
COUGH: 0
VOICE CHANGE: 0
NAUSEA: 0
PHOTOPHOBIA: 0
TROUBLE SWALLOWING: 0
SHORTNESS OF BREATH: 0
VOMITING: 0
EYE DISCHARGE: 0
WHEEZING: 0
SORE THROAT: 0
BACK PAIN: 0

## 2021-05-25 NOTE — PROGRESS NOTES
THERAPEUTIC PHLEBOTOMY    Most recent Hemoglobin 15.0 Gm/dl and Hematocrit 46.6%    Date obtained: 5 /25 /2021      Pre-phlebotomy Vital Signs: Refer to Doc flow sheet    Start time: 9:45     Tourniquet placed on patient's arm and arm palpated for venous access. Area of venous access cleansed with alcohol. Sheath removed from the needle and needle inserted into the right antecubital site. Blood flowing well down the tubing into collection bag. Adjustment/no adjustment of needle needed to maintain adequate blood flow. Scale monitoring amount of blood in bag. Stop Time: 10:00  Needle removed from patient's arm. Pressure applied to patient's arm until bleeding stopped. Dry sterile dressing applied over puncture site and secured with Coban/self-adherent wrap. Final amount of blood removed: 250cc  Post Vital Signs: Refer to Doc flow sheet      Patient tolerated procedure well. No redness, edema, or signs of active bleeding noted. Discharge Instructions provided: No heavy pushing or lifting for the next 24 hours. Keep dressing dry and in place for 4 to 6 hours. If a fever GREATER than 100.5 develops, contact office. Patient discharged ambulatory with belongings.

## 2021-08-24 DIAGNOSIS — E83.110 HEREDITARY HEMOCHROMATOSIS (HCC): Primary | ICD-10-CM

## 2021-08-24 DIAGNOSIS — D72.829 LEUKOCYTOSIS, UNSPECIFIED TYPE: ICD-10-CM

## 2021-08-24 DIAGNOSIS — D69.6 THROMBOCYTOPENIA (HCC): ICD-10-CM

## 2021-08-25 NOTE — PROGRESS NOTES
Progress Note      Pt Name: Fabian Espinosa  YOB: 1980  MRN: [id number]    Date of evaluation: 8/26/2021  History Obtained From:  patient, electronic medical record    CHIEF COMPLAINT:    Chief Complaint   Patient presents with    Follow-up     Hereditary hemochromatosis ([address]    Other     Elevated ferritin     Current active problems  Heterozygous H63D hereditary hemochromatosis  Hepatic steatosis    HISTORY OF PRESENT ILLNESS:    Fabian Espinosa is a 38 y.o.  female seen previously in the office on 4/15/2020 due to an elevated hematocrit and mildly elevated ferritin. Elevated hematocrit was most likely due to a decrease in liquid intake during the day, she has been doing better. Work-up has revealed heterozygous H63D hereditary hemochromatosis. She does have hepatic steatosis with mild elevation of liver functions and because of the LFTs being elevated a 200 cc therapeutic body was performed last visit. She tolerated it well. She did have an ultrasound of the abdomen performed at [facility] and is here to review the results and for reevaluation. She was ultimately found to have a torn ligament of her left ankleshe has completed a boot, brace wearing of the left ankle. There is no specific reason why she tore her ligament. She was treated by Dr. Catalina Jiang. 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 She has not required taking it in significant length of time. HEMATOLOGY HISTORY: Heterozygous H63D hereditary hemochromatosis  Marjorie Christian was seen in initial hematology consultation on 4/15/2020 referred by CRUZ Adams for evaluation of her elevated hematocrit. CBC 3/26/2020 revealed a WBC of 5.92 with normal percent differential, Hgb 15.7, HCT 46.1, MCV 89.5, ,000.       CMP 3/26/2020 revealed AST 49 (<32) and ALT 67 (<33) with normal crt of 0.88 with GFR 82.   She reports she subsequently had an ultrasound of her liver at [facility] that revealed a fatty liver.     A prior CBC from 8/7/2019 revealed Hgb 15.6, HCT 46.9.     She had recently been evaluated by Dr. Brad Rangel as well. She reports that she has had a change in her menstruation painful over the 2 cycles. Menstrual flow, duration has been the same.     She was told that she could have too much iron on board and was referred for evaluation. She does not take iron replacement therapy. She does have some significant fatigue issues. She also has tachycardia issues but has had that in the past and had it evaluated by Dr. Marie Cleveland about 7 years ago. She was previously on Toprol but does not take that anymore.     CBC 3/26/2020 revealed a WBC of 5.92 with normal percent differential, Hgb 15.7, HCT 46.1, MCV 89.5, ,000.     CMP 3/26/2020 revealed AST 49 (<32) and ALT 67 (<33) with normal crt of 0.88 with GFR 82. She reports she subsequently had an ultrasound of her liver at [facility] that revealed a fatty liver.     A prior CBC from 8/7/2019 revealed Hgb 15.6, HCT 46.9.     She had recently been evaluated by Dr. Brad Rangel as well. She reports that she has had a change in her menstruation painful over the 2 cycles. Menstrual flow, duration has been the same.     She was told that she could have too much iron on board and was referred for evaluation. She does not take iron replacement therapy.       Initial CBC in the office on 4/18/2020 revealed a WBC of 6.38 with a normal percent differential, Hgb 12.4, MCV 92.9, MCHC 30.6 ,000. Her Hgb is not elevated whatsoever today. Her MCHC is slightly low.     Hepatic ultrasound [facility] 4/1/2020 revealed coarsening of the hepatic echotexture consistent with fatty infiltration with no focal hepatic abnormality.     Serology 4/15/2020  Serum Fe - 88  TIBC - 312  Fe sat - 28.2%  Ferritin - 143  TSH - 2.17      Serology 6/9/2020  Serum Fe - 75  TIBC - 366  Fe sat - 20%  Ferritin - 150 (6.2-137)    Serology 10/2/2020  Serum Fe - 135  TIBC - 460  Fe sat - 29%  Ferritin - 175  CMP - AST 44, ALT 55     Labs from 2021 reveals that her ferritin has increased slightly. Iron saturation also is up to 44%. LFTs only reveal a minimal elevation of ALT at 37 (0-32)    Hemochromatosis panel 2021 -heterozygous H63D mutation    Abdominal ultrasound [facility] 2021 revealed  · Mild diffuse fatty infiltration of the liver  · \"Normal spleen\"    AFP 1.7 on 2021      TREATMENT SUMMARY  1.  Therapeutic phlebotomy 250 cc 2021        Past Medical History:   Diagnosis Date    Heart murmur     Kidney stones         Past Surgical History:   Procedure Laterality Date    BLADDER SURGERY       SECTION      CHOLECYSTECTOMY      [address]     [address]    LITHOTRIPSY      UPPER GASTROINTESTINAL ENDOSCOPY             Current Outpatient Medications:     hyoscyamine (ANASPAZ;LEVSIN) 125 MCG tablet, Take 125 mcg by mouth every 4 hours as needed for Cramping, Disp: , Rfl:      Allergies   Allergen Reactions    Azithromycin Hives    Sulfa Antibiotics      Other reaction(s): Nausea And Vomiting       Social History     Tobacco Use    Smoking status: Never Smoker    Smokeless tobacco: Never Used   Substance Use Topics    Alcohol use: Not Currently    Drug use: Never       Family History   Problem Relation Age of Onset    High Blood Pressure Mother     Osteoporosis Mother     High Cholesterol Mother     High Blood Pressure Father     Stroke Father     Diabetes Father     High Blood Pressure Brother     Other Maternal Grandmother         MDS    Cancer Maternal Grandmother         Skin Cancer    High Blood Pressure Maternal Grandmother     Stroke Maternal Grandmother     Stroke Paternal Grandmother     Heart Disease Paternal Grandmother     Diabetes Paternal Grandmother        Subjective   REVIEW OF SYSTEMS:   Review of Systems   Constitutional: Positive for fatigue (Mild). Negative for chills, diaphoresis, fever and unexpected weight change. HENT: Negative for mouth sores, nosebleeds, sore throat, trouble swallowing and voice change. Eyes: Negative for photophobia, discharge and itching. Respiratory: Negative for cough, shortness of breath and wheezing. Cardiovascular: Negative for chest pain, palpitations and leg swelling. Gastrointestinal: Negative for abdominal distention, abdominal pain, blood in stool, constipation, diarrhea, nausea and vomiting. Endocrine: Negative for cold intolerance, heat intolerance, polydipsia and polyuria. Genitourinary: Negative for difficulty urinating, dysuria, hematuria, menstrual problem and urgency. Musculoskeletal: Negative for back pain, joint swelling and myalgias. Skin: Negative for color change and rash. Neurological: Negative for dizziness, tremors, seizures, syncope and light-headedness. Hematological: Negative for adenopathy. Does not bruise/bleed easily. Psychiatric/Behavioral: Negative for behavioral problems and suicidal ideas. The patient is not nervous/anxious. All other systems reviewed and are negative. Objective   BP (!) 130/90   Pulse 87   Ht 5' 4\" (1.626 m)   Wt 211 lb 9.6 oz (96 kg)   SpO2 97%   BMI 36.32 kg/m²     PHYSICAL EXAM:  Physical Exam  Constitutional:       Appearance: She is well-developed. HENT:      Head: Normocephalic and atraumatic. Eyes:      General: No scleral icterus. Conjunctiva/sclera: Conjunctivae normal.   Neck:      Trachea: No tracheal deviation. Cardiovascular:      Rate and Rhythm: Normal rate and regular rhythm. Heart sounds: Normal heart sounds. No murmur heard. Pulmonary:      Effort: Pulmonary effort is normal. No respiratory distress. Breath sounds: Normal breath sounds. Abdominal:      General: Bowel sounds are normal. There is no distension. Palpations: Abdomen is soft.  There is no fluid wave, hepatomegaly or splenomegaly. Tenderness: There is no abdominal tenderness. Musculoskeletal:         General: No tenderness or deformity. Cervical back: Normal range of motion and neck supple. Skin:     General: Skin is warm and dry. Findings: No rash. Neurological:      Mental Status: She is alert and oriented to person, place, and time. Coordination: Coordination normal.   Psychiatric:         Behavior: Behavior normal.         Thought Content: Thought content normal.       CBC reviewed by me  Lab Results   Component Value Date    WBC 7.12 08/26/2021    HGB 15.4 08/26/2021    HCT 48.1 (H) 08/26/2021    MCV 94.3 08/26/2021     08/26/2021     Lab Results   Component Value Date    NEUTROABS 3.86 08/26/2021       VISIT DIAGNOSES  1. Hereditary hemochromatosis ([address]    2. Leukocytosis, unspecified type    3. Thrombocytopenia (HCC)        ASSESSMENT/PLAN:    1. Heterozygous H63D Hereditary hemochromatosis -     Hepatic ultrasound [facility] 4/1/2020 revealed coarsening of the hepatic echotexture consistent with fatty infiltration with no focal hepatic abnormality. Hemochromatosis panel 4/27/2021 -heterozygous H63D mutation    I discussed her abnormality and the fact that typically this would mean she is a carrier and would not need to be treated. However, she has had elevated LFTs in the past with a fatty liver. .    She has denied any cardiac symptoms whatsoever. AFP 1.7 on 5/25/2021    Abdominal ultrasound [facility] 6/8/2021 revealed  · Mild diffuse fatty infiltration of the liver  · \"Normal spleen\"          LFTs normal last visit on 5/25/2021. Ferritin in normal range at 312    I discussed the ultrasound aboveonly mild fatty infiltration of the liver. She is not established with GI/gastroenterologist was at [facility] when she was found to have her malfunctioning sphincter of Oddi. If LFTs elevate we will refer her to local gastroenterology.     PLAN  Recheck CMP, iron panel, ferritin  Hold on therapeutic phlebotomy  I encouraged low-fat diet because of her hepatic steatosis. 2.  Leukocytosis    WBC was 17.6 with 75.9% PMN when she was here on 4/27/2021. WBC 7.12 with normal percent differential  - WBC has been normal last 2 visits. 3.  Thrombocytopenia. PLT normal at 220,000. She does have hepatic steatosis. · Cervical cancer screening. Pap annually per Dr. Marilyn Andres    · Breast cancer screening. Bilateral screening mammogram at [address] [place] [address] 5/24/2021 was BI-RADS 1      Immunization    She has not had COVID-19. She has no desire to get COVID-19 immunization. I have encouraged COVID-19 vaccination. Orders Placed This Encounter   Procedures    Iron and TIBC    Ferritin    Comprehensive Metabolic Panel        Return in about 3 months (around 11/26/2021) for With Kyle Bragg.      Maira Noyola PA-C  9:24 AM  8/26/2021

## 2021-08-26 ENCOUNTER — HOSPITAL ENCOUNTER (OUTPATIENT)
Dept: INFUSION THERAPY | Age: 41
Discharge: HOME | End: 2021-08-26
Payer: COMMERCIAL

## 2021-08-26 ENCOUNTER — OFFICE VISIT (OUTPATIENT)
Dept: HEMATOLOGY | Age: 41
End: 2021-08-26
Payer: MEDICAID

## 2021-08-26 VITALS
WEIGHT: 211.6 LBS | SYSTOLIC BLOOD PRESSURE: 130 MMHG | HEIGHT: 64 IN | DIASTOLIC BLOOD PRESSURE: 90 MMHG | OXYGEN SATURATION: 97 % | HEART RATE: 87 BPM | BODY MASS INDEX: 36.12 KG/M2

## 2021-08-26 DIAGNOSIS — D69.6 THROMBOCYTOPENIA (HCC): ICD-10-CM

## 2021-08-26 DIAGNOSIS — E83.110 HEREDITARY HEMOCHROMATOSIS (HCC): Primary | ICD-10-CM

## 2021-08-26 DIAGNOSIS — E83.110 HEREDITARY HEMOCHROMATOSIS (HCC): ICD-10-CM

## 2021-08-26 DIAGNOSIS — D72.829 LEUKOCYTOSIS, UNSPECIFIED TYPE: ICD-10-CM

## 2021-08-26 LAB
BASOPHILS ABSOLUTE: 0.05 K/UL (ref 0.01–0.08)
BASOPHILS RELATIVE PERCENT: 0.7 % (ref 0.1–1.2)
EOSINOPHILS ABSOLUTE: 0.23 K/UL (ref 0.04–0.54)
EOSINOPHILS RELATIVE PERCENT: 3.2 % (ref 0.7–7)
HCT VFR BLD CALC: 48.1 % (ref 34.1–44.9)
HEMOGLOBIN: 15.4 G/DL (ref 11.2–15.7)
LYMPHOCYTES ABSOLUTE: 2.32 K/UL (ref 1.18–3.74)
LYMPHOCYTES RELATIVE PERCENT: 32.6 % (ref 19.3–53.1)
MCH RBC QN AUTO: 30.2 PG (ref 25.6–32.2)
MCHC RBC AUTO-ENTMCNC: 32 G/DL (ref 32.3–35.5)
MCV RBC AUTO: 94.3 FL (ref 79.4–94.8)
MONOCYTES ABSOLUTE: 0.66 K/UL (ref 0.24–0.82)
MONOCYTES RELATIVE PERCENT: 9.3 % (ref 4.7–12.5)
NEUTROPHILS ABSOLUTE: 3.86 K/UL (ref 1.56–6.13)
NEUTROPHILS RELATIVE PERCENT: 54.2 % (ref 34–71.1)
PDW BLD-RTO: 13.3 % (ref 11.7–14.4)
PLATELET # BLD: 220 K/UL (ref 182–369)
PMV BLD AUTO: 10.9 FL (ref 7.4–10.4)
RBC # BLD: 5.1 M/UL (ref 3.93–5.22)
WBC # BLD: 7.12 K/UL (ref 3.98–10.04)

## 2021-08-26 PROCEDURE — 99213 OFFICE O/P EST LOW 20 MIN: CPT | Performed by: PHYSICIAN ASSISTANT

## 2021-08-26 PROCEDURE — 99212 OFFICE O/P EST SF 10 MIN: CPT

## 2021-08-26 PROCEDURE — 85025 COMPLETE CBC W/AUTO DIFF WBC: CPT

## 2021-08-26 ASSESSMENT — ENCOUNTER SYMPTOMS
COUGH: 0
COLOR CHANGE: 0
EYE ITCHING: 0
SHORTNESS OF BREATH: 0
VOMITING: 0
ABDOMINAL PAIN: 0
PHOTOPHOBIA: 0
DIARRHEA: 0
VOICE CHANGE: 0
NAUSEA: 0
ABDOMINAL DISTENTION: 0
CONSTIPATION: 0
SORE THROAT: 0
TROUBLE SWALLOWING: 0
BACK PAIN: 0
EYE DISCHARGE: 0
WHEEZING: 0
BLOOD IN STOOL: 0

## 2021-11-16 NOTE — PROGRESS NOTES
1.7 on 2021      TREATMENT SUMMARY  1. Therapeutic phlebotomy 250 cc 2021        Past Medical History:   Diagnosis Date    Heart murmur     Kidney stones         Past Surgical History:   Procedure Laterality Date    BLADDER SURGERY  1981     SECTION  2013    CHOLECYSTECTOMY  2011    HY[address]OSCOPY  2017   [address]    LITHOTRIPSY      UPPER GASTROINTESTINAL ENDOSCOPY             Current Outpatient Medications:     metoprolol succinate (TOPROL XL) 25 MG extended release tablet, , Disp: , Rfl:     fexofenadine (ALLEGRA) 180 MG tablet, , Disp: , Rfl:     fluticasone (FLONASE) 50 MCG/ACT nasal spray, , Disp: , Rfl:     benzonatate (TESSALON) 100 MG capsule, , Disp: , Rfl:      Allergies   Allergen Reactions    Azithromycin Hives    Sulfa Antibiotics      Other reaction(s): Nausea And Vomiting       Social History     Tobacco Use    Smoking status: Never Smoker    Smokeless tobacco: Never Used   Substance Use Topics    Alcohol use: Not Currently    Drug use: Never       Family History   Problem Relation Age of Onset    High Blood Pressure Mother     Osteoporosis Mother     High Cholesterol Mother     High Blood Pressure Father     Stroke Father     Diabetes Father     High Blood Pressure Brother     Other Maternal Grandmother         MDS    Cancer Maternal Grandmother         Skin Cancer    High Blood Pressure Maternal Grandmother     Stroke Maternal Grandmother     Stroke Paternal Grandmother     Heart Disease Paternal Grandmother     Diabetes Paternal Grandmother        Subjective   REVIEW OF SYSTEMS:   Review of Systems   Constitutional: Positive for fatigue (Mild). Negative for chills, diaphoresis, fever and unexpected weight change. HENT: Negative for mouth sores, nosebleeds, sore throat, trouble swallowing and voice change. Eyes: Negative for photophobia, discharge and itching.    Respiratory: Negative for cough, shortness of breath and wheezing. Cardiovascular: Negative for chest pain, palpitations and leg swelling. Gastrointestinal: Negative for abdominal distention, abdominal pain, blood in stool, constipation, diarrhea, nausea and vomiting. Endocrine: Negative for cold intolerance, heat intolerance, polydipsia and polyuria. Genitourinary: Negative for difficulty urinating, dysuria, hematuria, menstrual problem and urgency. Musculoskeletal: Negative for back pain, joint swelling and myalgias. Skin: Negative for color change and rash. Allergic/Immunologic: Positive for environmental allergies. Neurological: Negative for dizziness, tremors, seizures, syncope and light-headedness. Hematological: Negative for adenopathy. Does not bruise/bleed easily. Psychiatric/Behavioral: Negative for behavioral problems and suicidal ideas. The patient is not nervous/anxious. All other systems reviewed and are negative. Objective   BP (!) 124/98   Pulse 83   Ht 5' 4\" (1.626 m)   Wt 214 lb 8 oz (97.3 kg)   SpO2 98%   BMI 36.82 kg/m²     PHYSICAL EXAM:  Physical Exam  Constitutional:       Appearance: She is well-developed. HENT:      Head: Normocephalic and atraumatic. Eyes:      General: No scleral icterus. Conjunctiva/sclera: Conjunctivae normal.   Neck:      Trachea: No tracheal deviation. Cardiovascular:      Rate and Rhythm: Normal rate and regular rhythm. Heart sounds: Normal heart sounds. No murmur heard. Pulmonary:      Effort: Pulmonary effort is normal. No respiratory distress. Breath sounds: Normal breath sounds. Abdominal:      General: Bowel sounds are normal. There is no distension. Palpations: Abdomen is soft. There is no fluid wave, hepatomegaly or splenomegaly. Tenderness: There is no abdominal tenderness. Musculoskeletal:         General: No tenderness or deformity. Cervical back: Normal range of motion and neck supple. Skin:     General: Skin is warm and dry. Findings: No rash. Neurological:      Mental Status: She is alert and oriented to person, place, and time. Coordination: Coordination normal.   Psychiatric:         Behavior: Behavior normal.         Thought Content: Thought content normal.       CBC reviewed by me  Lab Results   Component Value Date    WBC 7.00 11/18/2021    HGB 15.6 11/18/2021    HCT 46.3 (H) 11/18/2021    MCV 91.5 11/18/2021     (L) 11/18/2021     Lab Results   Component Value Date    NEUTROABS 3.94 11/18/2021       VISIT DIAGNOSES  1. Hereditary hemochromatosis ([facility] [address]        ASSESSMENT/PLAN:    1. Heterozygous H63D Hereditary hemochromatosis -     AFP 1.7 on 5/25/2021    Abdominal ultrasound [facility] 6/8/2021 revealed  · Mild diffuse fatty infiltration of the liver  · \"Normal spleen\"    Serology 8/26/2021  Serum Fe - 74  TIBC - 280  Fe saturation - 26%  Ferritin - 172    CMP from [facility] on 11/16/2021 revealed ALT 78 otherwise LFTs WNL      PLAN  Recheck iron panel, ferritin  500 cc therapeutic phlebotomy today  I again encouraged low-fat diet because of her hepatic steatosis. 2.  Thrombocytopenia. ,000 which is minimally low and will continue to be followed closely. · Cervical cancer screening. Pap annually per Dr. Villafana Nicely    · Breast cancer screening. Bilateral screening mammogram at [address] [place] [address] 5/24/2021 was BI-RADS 1      Immunization  She has not had COVID-19. She has no desire to get COVID-19 immunization. I have encouraged COVID-19 vaccination. Orders Placed This Encounter   Procedures    Iron and TIBC    Ferritin        Return in about 3 months (around 2/18/2022) for With William Gaston and therapeutic phlebotomy.      Jens Thorne PA-C  9:24 AM  11/18/2021

## 2021-11-18 ENCOUNTER — OFFICE VISIT (OUTPATIENT)
Dept: HEMATOLOGY | Age: 41
End: 2021-11-18
Payer: COMMERCIAL

## 2021-11-18 ENCOUNTER — HOSPITAL ENCOUNTER (OUTPATIENT)
Dept: INFUSION THERAPY | Age: 41
Discharge: HOME | End: 2021-11-18
Payer: COMMERCIAL

## 2021-11-18 VITALS
HEART RATE: 83 BPM | WEIGHT: 214.5 LBS | HEIGHT: 64 IN | DIASTOLIC BLOOD PRESSURE: 98 MMHG | OXYGEN SATURATION: 98 % | BODY MASS INDEX: 36.62 KG/M2 | SYSTOLIC BLOOD PRESSURE: 124 MMHG

## 2021-11-18 DIAGNOSIS — E83.110 HEREDITARY HEMOCHROMATOSIS (HCC): Primary | ICD-10-CM

## 2021-11-18 DIAGNOSIS — D72.829 LEUKOCYTOSIS, UNSPECIFIED TYPE: ICD-10-CM

## 2021-11-18 DIAGNOSIS — D69.6 THROMBOCYTOPENIA (HCC): ICD-10-CM

## 2021-11-18 LAB
BASOPHILS ABSOLUTE: 0.02 K/UL (ref 0.01–0.08)
BASOPHILS RELATIVE PERCENT: 0.3 % (ref 0.1–1.2)
EOSINOPHILS ABSOLUTE: 0.27 K/UL (ref 0.04–0.54)
EOSINOPHILS RELATIVE PERCENT: 3.9 % (ref 0.7–7)
FERRITIN: 133 NG/ML (ref 6.2–137)
HCT VFR BLD CALC: 46.3 % (ref 34.1–44.9)
HEMOGLOBIN: 15.6 G/DL (ref 11.2–15.7)
IRON SATURATION: 31 % (ref 14–50)
IRON: 143 UG/DL (ref 37–170)
LYMPHOCYTES ABSOLUTE: 2.21 K/UL (ref 1.18–3.74)
LYMPHOCYTES RELATIVE PERCENT: 31.6 % (ref 19.3–53.1)
MCH RBC QN AUTO: 30.8 PG (ref 25.6–32.2)
MCHC RBC AUTO-ENTMCNC: 33.7 G/DL (ref 32.3–35.5)
MCV RBC AUTO: 91.5 FL (ref 79.4–94.8)
MONOCYTES ABSOLUTE: 0.56 K/UL (ref 0.24–0.82)
MONOCYTES RELATIVE PERCENT: 8 % (ref 4.7–12.5)
NEUTROPHILS ABSOLUTE: 3.94 K/UL (ref 1.56–6.13)
NEUTROPHILS RELATIVE PERCENT: 56.2 % (ref 34–71.1)
PDW BLD-RTO: 12.9 % (ref 11.7–14.4)
PLATELET # BLD: 170 K/UL (ref 182–369)
PMV BLD AUTO: 11.6 FL (ref 7.4–10.4)
RBC # BLD: 5.06 M/UL (ref 3.93–5.22)
TOTAL IRON BINDING CAPACITY: 467 UG/DL (ref 265–497)
WBC # BLD: 7 K/UL (ref 3.98–10.04)

## 2021-11-18 PROCEDURE — 99213 OFFICE O/P EST LOW 20 MIN: CPT | Performed by: PHYSICIAN ASSISTANT

## 2021-11-18 PROCEDURE — 83540 ASSAY OF IRON: CPT

## 2021-11-18 PROCEDURE — 83550 IRON BINDING TEST: CPT

## 2021-11-18 PROCEDURE — 99211 OFF/OP EST MAY X REQ PHY/QHP: CPT

## 2021-11-18 PROCEDURE — 82728 ASSAY OF FERRITIN: CPT

## 2021-11-18 PROCEDURE — 85025 COMPLETE CBC W/AUTO DIFF WBC: CPT

## 2021-11-18 PROCEDURE — 99195 PHLEBOTOMY: CPT

## 2021-11-18 RX ORDER — METOPROLOL SUCCINATE 25 MG/1
TABLET, EXTENDED RELEASE ORAL
COMMUNITY
Start: 2021-11-16

## 2021-11-18 RX ORDER — FLUTICASONE PROPIONATE 50 MCG
SPRAY, SUSPENSION (ML) NASAL
COMMUNITY
Start: 2021-11-12 | End: 2022-02-17

## 2021-11-18 RX ORDER — 0.9 % SODIUM CHLORIDE 0.9 %
250 INTRAVENOUS SOLUTION INTRAVENOUS ONCE
Status: CANCELLED | OUTPATIENT
Start: 2021-11-18 | End: 2021-11-18

## 2021-11-18 RX ORDER — FEXOFENADINE HCL 180 MG/1
TABLET ORAL
COMMUNITY
Start: 2021-11-12 | End: 2022-02-17

## 2021-11-18 RX ORDER — 0.9 % SODIUM CHLORIDE 0.9 %
500 INTRAVENOUS SOLUTION INTRAVENOUS ONCE
Status: CANCELLED | OUTPATIENT
Start: 2021-11-18 | End: 2021-11-18

## 2021-11-18 RX ORDER — AMOXICILLIN 500 MG/1
CAPSULE ORAL
COMMUNITY
Start: 2021-11-12 | End: 2021-11-18

## 2021-11-18 RX ORDER — BENZONATATE 100 MG/1
CAPSULE ORAL
COMMUNITY
Start: 2021-11-12 | End: 2022-02-17

## 2021-11-18 ASSESSMENT — ENCOUNTER SYMPTOMS
PHOTOPHOBIA: 0
VOMITING: 0
COUGH: 0
BLOOD IN STOOL: 0
ABDOMINAL DISTENTION: 0
SHORTNESS OF BREATH: 0
TROUBLE SWALLOWING: 0
VOICE CHANGE: 0
EYE ITCHING: 0
DIARRHEA: 0
ABDOMINAL PAIN: 0
WHEEZING: 0
NAUSEA: 0
EYE DISCHARGE: 0
CONSTIPATION: 0
SORE THROAT: 0
COLOR CHANGE: 0
BACK PAIN: 0

## 2021-11-18 NOTE — PROGRESS NOTES
THERAPEUTIC PHLEBOTOMY    Most recent Hemoglobin 15.6 Gm/dl and Hematocrit 46.3%    Date obtained: 11 /18 /2021      Pre-phlebotomy Vital Signs: Refer to Doc flow sheet    Start time: 945    Tourniquet placed on patient's arm and arm palpated for venous access. Area of venous access cleansed with alcohol. Sheath removed from the needle and needle inserted into the right antecubital site. Blood flowing well down the tubing into collection bag. Adjustment/no adjustment of needle needed to maintain adequate blood flow. Scale monitoring amount of blood in bag. Stop Time: 1000  Needle removed from patient's arm. Pressure applied to patient's arm until bleeding stopped. Dry sterile dressing applied over puncture site and secured with Coban/self-adherent wrap. Final amount of blood removed: 350  Post Vital Signs: Refer to Doc flow sheet      Patient tolerated procedure well. No redness, edema, or signs of active bleeding noted. Discharge Instructions provided: No heavy pushing or lifting for the next 24 hours. Keep dressing dry and in place for 4 to 6 hours. If a fever GREATER than 100.5 develops, contact office. Patient discharged ambulatory with belongings.

## 2022-02-14 NOTE — PROGRESS NOTES
Progress Note      Pt Name: Mike Bateman  YOB: 1980  MRN: [id number]    Date of evaluation: 2/17/2022  History Obtained From:  patient, electronic medical record    CHIEF COMPLAINT:    Chief Complaint   Patient presents with    Follow-up     Hereditary hemochromatosis ([address]     Current active problems  Heterozygous H63D hereditary hemochromatosis  Hepatic steatosis    HISTORY OF PRESENT ILLNESS:    Mike Bateman is a 42 y.o.  female with a diagnosis of heterozygous H63D hereditary hemochromatosis followed in the office since 4/15/2020. She does have a fatty liver as well. Her AFP has been mildly elevated, the rest of her liver panel has been within normal limits. She is getting therapeutic phlebotomies periodically because of her fatty liver and mildly elevated LFTs. She has done well with the therapeutic phlebotomies without any significant side effects. She is now on Metformin for diabetes with A1c 6.8she follows up tomorrow. Blood pressure has been fairly stable on her metoprolol. HEMATOLOGY HISTORY: Heterozygous H63D hereditary hemochromatosis  Lea Tushar was seen in initial hematology consultation on 4/15/2020 referred by CRUZ Espino for evaluation of her elevated hematocrit. CBC 3/26/2020 revealed a WBC of 5.92 with normal percent differential, Hgb 15.7, HCT 46.1, MCV 89.5, ,000.       CMP 3/26/2020 revealed AST 49 (<32) and ALT 67 (<33) with normal crt of 0.88 with GFR 82. She reports she subsequently had an ultrasound of her liver at [facility] that revealed a fatty liver.     A prior CBC from 8/7/2019 revealed Hgb 15.6, HCT 46.9.     She had recently been evaluated by Dr. Kermit Hernandez as well. She reports that she has had a change in her menstruation painful over the 2 cycles. Menstrual flow, duration has been the same.     She was told that she could have too much iron on board and was referred for evaluation.   She does not take iron replacement therapy. She does have some significant fatigue issues. She also has tachycardia issues but has had that in the past and had it evaluated by Dr. Vishal Woodall about 7 years ago. She was previously on Toprol but does not take that anymore.     CBC 3/26/2020 revealed a WBC of 5.92 with normal percent differential, Hgb 15.7, HCT 46.1, MCV 89.5, ,000.     CMP 3/26/2020 revealed AST 49 (<32) and ALT 67 (<33) with normal crt of 0.88 with GFR 82. She reports she subsequently had an ultrasound of her liver at [facility] that revealed a fatty liver.     A prior CBC from 8/7/2019 revealed Hgb 15.6, HCT 46.9.     She had recently been evaluated by Dr. Beverley Herrera as well. She reports that she has had a change in her menstruation painful over the 2 cycles. Menstrual flow, duration has been the same.     She was told that she could have too much iron on board and was referred for evaluation. She does not take iron replacement therapy.       Initial CBC in the office on 4/18/2020 revealed a WBC of 6.38 with a normal percent differential, Hgb 12.4, MCV 92.9, MCHC 30.6 ,000. Her Hgb is not elevated whatsoever today. Her MCHC is slightly low.     Hepatic ultrasound [facility] 4/1/2020 revealed coarsening of the hepatic echotexture consistent with fatty infiltration with no focal hepatic abnormality. Serology 4/15/2020  Serum Fe - 88  TIBC - 312  Fe sat - 28.2%  Ferritin - 143  TSH - 2.17      Serology 6/9/2020  Serum Fe - 75  TIBC - 366  Fe sat - 20%  Ferritin - 150 (6.2-137)    Serology 10/2/2020  Serum Fe - 135  TIBC - 460  Fe sat - 29%  Ferritin - 175  CMP - AST 44, ALT 55     Labs from 4/13/2021 reveals that her ferritin has increased slightly. Iron saturation also is up to 44%.   LFTs only reveal a minimal elevation of ALT at 37 (0-32)    Hemochromatosis panel 4/27/2021 -heterozygous H63D mutation    Abdominal ultrasound [facility] 6/8/2021 revealed  · Mild diffuse fatty infiltration of the liver  · \"Normal spleen\"    AFP 1.7 on 2021      TREATMENT SUMMARY  1. Therapeutic phlebotomy 250 cc 2021        Past Medical History:   Diagnosis Date    Heart murmur     Kidney stones         Past Surgical History:   Procedure Laterality Date    BLADDER SURGERY  1981     SECTION  2013    CHOLECYSTECTOMY  2011    HYSTEROSCOPY  2017   [address]    LITHOTRIPSY  2005    UPPER GASTROINTESTINAL ENDOSCOPY             Current Outpatient Medications:     metFORMIN (GLUCOPHAGE) 500 MG tablet, Take 500 mg by mouth in the morning and at bedtime, Disp: , Rfl:     metoprolol succinate (TOPROL XL) 25 MG extended release tablet, , Disp: , Rfl:      Allergies   Allergen Reactions    Azithromycin Hives    Sulfa Antibiotics      Other reaction(s): Nausea And Vomiting       Social History     Tobacco Use    Smoking status: Never Smoker    Smokeless tobacco: Never Used   Substance Use Topics    Alcohol use: Not Currently    Drug use: Never       Family History   Problem Relation Age of Onset    High Blood Pressure Mother     Osteoporosis Mother     High Cholesterol Mother     High Blood Pressure Father     Stroke Father     Diabetes Father     High Blood Pressure Brother     Other Maternal Grandmother         MDS    Cancer Maternal Grandmother         Skin Cancer    High Blood Pressure Maternal Grandmother     Stroke Maternal Grandmother     Stroke Paternal Grandmother     Heart Disease Paternal Grandmother     Diabetes Paternal Grandmother        Subjective   REVIEW OF SYSTEMS:   Review of Systems   Constitutional: Positive for fatigue (Mild). Negative for chills, diaphoresis, fever and unexpected weight change. HENT: Negative for mouth sores, nosebleeds, sore throat, trouble swallowing and voice change. Eyes: Negative for photophobia, discharge and itching. Respiratory: Negative for cough, shortness of breath and wheezing. Cardiovascular: Negative for chest pain, palpitations and leg swelling. Gastrointestinal: Negative for abdominal distention, abdominal pain, blood in stool, constipation, diarrhea, nausea and vomiting. Endocrine: Negative for cold intolerance, heat intolerance, polydipsia and polyuria. Genitourinary: Negative for difficulty urinating, dysuria, hematuria, menstrual problem and urgency. Musculoskeletal: Negative for back pain, joint swelling and myalgias. Skin: Negative for color change and rash. Allergic/Immunologic: Positive for environmental allergies. Neurological: Negative for dizziness, tremors, seizures, syncope and light-headedness. Hematological: Negative for adenopathy. Does not bruise/bleed easily. Psychiatric/Behavioral: Negative for behavioral problems and suicidal ideas. The patient is not nervous/anxious. All other systems reviewed and are negative. Objective   BP (!) 142/88   Pulse 102   Wt 207 lb 4.8 oz (94 kg)   SpO2 98%   BMI 35.58 kg/m²     PHYSICAL EXAM:  Physical Exam  Constitutional:       Appearance: She is well-developed. HENT:      Head: Normocephalic and atraumatic. Eyes:      General: No scleral icterus. Conjunctiva/sclera: Conjunctivae normal.   Neck:      Trachea: No tracheal deviation. Cardiovascular:      Rate and Rhythm: Normal rate and regular rhythm. Heart sounds: Normal heart sounds. No murmur heard. Pulmonary:      Effort: Pulmonary effort is normal. No respiratory distress. Breath sounds: Normal breath sounds. Abdominal:      General: Bowel sounds are normal. There is no distension. Palpations: Abdomen is soft. There is no fluid wave, hepatomegaly or splenomegaly. Tenderness: There is no abdominal tenderness. Musculoskeletal:         General: No tenderness or deformity. Cervical back: Normal range of motion and neck supple. Skin:     General: Skin is warm and dry. Findings: No rash. Neurological:      Mental Status: She is alert and oriented to person, place, and time. Coordination: Coordination normal.   Psychiatric:         Behavior: Behavior normal.         Thought Content: Thought content normal.       CBC reviewed by me  Lab Results   Component Value Date    WBC 7.00 11/18/2021    HGB 15.6 11/18/2021    HCT 46.3 (H) 11/18/2021    MCV 91.5 11/18/2021     (L) 11/18/2021     Lab Results   Component Value Date    NEUTROABS 3.94 11/18/2021       VISIT DIAGNOSES  1. Hereditary hemochromatosis ([address]        ASSESSMENT/PLAN:    1. Heterozygous H63D Hereditary hemochromatosis -     AFP 1.7 on 5/25/2021    Abdominal ultrasound [facility] 6/8/2021 revealed  · Mild diffuse fatty infiltration of the liver  · \"Normal spleen\"    Serology 8/26/2021  Serum Fe - 74  TIBC - 280  Fe saturation - 26%  Ferritin - 172    CMP from [facility] on 11/16/2021 revealed ALT 78 otherwise LFTs WNL    Serology 11/18/2021  Serum Fe - 143  TIBC - 467  Fe sat - 31%  Ferritin - 133    She is being phlebotomized periodically to maintain a normal ferritin given her minimal elevation of her ALTs, hepatic steatosis. CBC today reveals a WBC of 8.1 with 61.4% PMN, 28.8% lymphocyte, 9.8% monocyte, Hgb 14.2, MCV 91.5, ,000. Physical examination today does not reveal any tenderness in the right upper quadrant. PLAN  Recheck iron panel, ferritin  500 cc therapeutic phlebotomy today  I continue to discuss a low-fat diet because of her hepatic steatosis. She has been able to lose 7 pounds since her last visit. 2.  Thrombocytopenia. PLT today is 248,000 which is normal.      · Cervical cancer screening. Pap annually per Dr. Aranza Prince    · Breast cancer screening. [facility] was BI-RADS 1      Immunization  She has not had COVID-19. She has no desire to get COVID-19 immunization. I have encouraged COVID-19 vaccination.       Orders Placed This Encounter   Procedures    Comprehensive Metabolic Panel    Iron and TIBC    Ferritin   · Therapeutic phlebotomy 500 cc today      Return in about 4 months (around 6/17/2022) for With Mendy Durant and therapeutic phlebotomy.      Ernesto Jimenez PA-C  9:41 AM  2/17/2022

## 2022-02-17 ENCOUNTER — OFFICE VISIT (OUTPATIENT)
Dept: HEMATOLOGY | Age: 42
End: 2022-02-17
Payer: COMMERCIAL

## 2022-02-17 ENCOUNTER — HOSPITAL ENCOUNTER (OUTPATIENT)
Dept: INFUSION THERAPY | Age: 42
Discharge: HOME | End: 2022-02-17
Payer: COMMERCIAL

## 2022-02-17 VITALS
BODY MASS INDEX: 35.58 KG/M2 | WEIGHT: 207.3 LBS | HEART RATE: 102 BPM | SYSTOLIC BLOOD PRESSURE: 142 MMHG | OXYGEN SATURATION: 98 % | DIASTOLIC BLOOD PRESSURE: 88 MMHG

## 2022-02-17 DIAGNOSIS — E83.110 HEREDITARY HEMOCHROMATOSIS (HCC): ICD-10-CM

## 2022-02-17 DIAGNOSIS — D69.6 THROMBOCYTOPENIA (HCC): ICD-10-CM

## 2022-02-17 DIAGNOSIS — E83.110 HEREDITARY HEMOCHROMATOSIS (HCC): Primary | ICD-10-CM

## 2022-02-17 DIAGNOSIS — D72.829 LEUKOCYTOSIS, UNSPECIFIED TYPE: ICD-10-CM

## 2022-02-17 LAB
ALBUMIN SERPL-MCNC: 4 G/DL (ref 3.5–5.2)
ALP BLD-CCNC: 94 U/L (ref 35–104)
ALT SERPL-CCNC: 65 U/L (ref 9–52)
ANION GAP SERPL CALCULATED.3IONS-SCNC: 11 MMOL/L (ref 7–19)
AST SERPL-CCNC: 53 U/L (ref 14–36)
BILIRUB SERPL-MCNC: 0.4 MG/DL (ref 0.2–1.3)
BUN BLDV-MCNC: 12 MG/DL (ref 7–17)
CALCIUM SERPL-MCNC: 9.5 MG/DL (ref 8.4–10.2)
CHLORIDE BLD-SCNC: 104 MMOL/L (ref 98–111)
CO2: 25 MMOL/L (ref 22–29)
CREAT SERPL-MCNC: 0.5 MG/DL (ref 0.5–1)
FERRITIN: 135.4 NG/ML (ref 13–150)
GFR NON-AFRICAN AMERICAN: >60
GLOBULIN: 2.9 G/DL
GLUCOSE BLD-MCNC: 76 MG/DL (ref 74–106)
HCT VFR BLD CALC: 43.9 % (ref 34.1–44.9)
HEMOGLOBIN: 14.2 G/DL (ref 11.2–15.7)
IRON SATURATION: 27 % (ref 14–50)
IRON: 67 UG/DL (ref 37–145)
MCH RBC QN AUTO: 29.6 PG (ref 25.6–32.2)
MCHC RBC AUTO-ENTMCNC: 32.3 G/DL (ref 32.3–35.5)
MCV RBC AUTO: 91.5 FL (ref 79.4–94.8)
PDW BLD-RTO: 12.9 % (ref 11.7–14.4)
PLATELET # BLD: 248 K/UL (ref 182–369)
PMV BLD AUTO: 10.1 FL (ref 7.4–10.4)
POTASSIUM SERPL-SCNC: 4.1 MMOL/L (ref 3.5–5.1)
RBC # BLD: 4.8 M/UL (ref 3.93–5.22)
SODIUM BLD-SCNC: 140 MMOL/L (ref 137–145)
TOTAL IRON BINDING CAPACITY: 250 UG/DL (ref 250–400)
TOTAL PROTEIN: 7 G/DL (ref 6.3–8.2)
WBC # BLD: 8.1 K/UL (ref 3.98–10.04)

## 2022-02-17 PROCEDURE — 99211 OFF/OP EST MAY X REQ PHY/QHP: CPT

## 2022-02-17 PROCEDURE — 99195 PHLEBOTOMY: CPT

## 2022-02-17 PROCEDURE — 80053 COMPREHEN METABOLIC PANEL: CPT

## 2022-02-17 PROCEDURE — 85027 COMPLETE CBC AUTOMATED: CPT

## 2022-02-17 PROCEDURE — 99213 OFFICE O/P EST LOW 20 MIN: CPT | Performed by: PHYSICIAN ASSISTANT

## 2022-02-17 RX ORDER — 0.9 % SODIUM CHLORIDE 0.9 %
500 INTRAVENOUS SOLUTION INTRAVENOUS ONCE
Status: CANCELLED | OUTPATIENT
Start: 2022-02-17 | End: 2022-02-17

## 2022-02-17 RX ORDER — 0.9 % SODIUM CHLORIDE 0.9 %
250 INTRAVENOUS SOLUTION INTRAVENOUS ONCE
Status: CANCELLED | OUTPATIENT
Start: 2022-02-17 | End: 2022-02-17

## 2022-02-17 ASSESSMENT — ENCOUNTER SYMPTOMS
NAUSEA: 0
COLOR CHANGE: 0
EYE ITCHING: 0
BACK PAIN: 0
WHEEZING: 0
CONSTIPATION: 0
PHOTOPHOBIA: 0
TROUBLE SWALLOWING: 0
COUGH: 0
SHORTNESS OF BREATH: 0
VOMITING: 0
SORE THROAT: 0
BLOOD IN STOOL: 0
VOICE CHANGE: 0
ABDOMINAL DISTENTION: 0
DIARRHEA: 0
EYE DISCHARGE: 0
ABDOMINAL PAIN: 0

## 2022-02-17 NOTE — PROGRESS NOTES
THERAPEUTIC PHLEBOTOMY    Most recent Hemoglobin 14.2Gm/dl and Hematocrit 43.9%    Date obtained: 02 /17 /2022      Pre-phlebotomy Vital Signs: Refer to Doc flow sheet    Start time: 1012    Tourniquet placed on patient's arm and arm palpated for venous access. Area of venous access cleansed with alcohol. Sheath removed from the needle and needle inserted into the left antecubital site. Blood flowing well down the tubing into collection bag. Adjustment/no adjustment of needle needed to maintain adequate blood flow. Scale monitoring amount of blood in bag. Stop Time: 1022  Needle removed from patient's arm. Pressure applied to patient's arm until bleeding stopped. Dry sterile dressing applied over puncture site and secured with Coban/self-adherent wrap. Final amount of blood removed: 100  Pt is a hard stick, with small veins. Patient tolerated procedure well. No redness, edema, or signs of active bleeding noted. Discharge Instructions provided: No heavy pushing or lifting for the next 24 hours. Keep dressing dry and in place for 4 to 6 hours. If a fever GREATER than 100.5 develops, contact office. Patient discharged ambulatory with belongings.

## 2022-06-15 NOTE — PROGRESS NOTES
Progress Note      Pt Name: Brianna Rodriguez  YOB: 1980  MRN: [id number]    Date of evaluation: 6/16/2022  History Obtained From:  patient, electronic medical record    CHIEF COMPLAINT:    Chief Complaint   Patient presents with    Follow-up     Hereditary hemochromatosis ([address]     Current active problems  Heterozygous H63D hereditary hemochromatosis  Hepatic steatosis    HISTORY OF PRESENT ILLNESS:    Brianna Rodriguez is a 42 y.o.  female with a diagnosis of heterozygous H63D hereditary hemochromatosis followed in the office since 4/15/2020. She does have a fatty liver as well. She has mild elevation of her transaminases. Since she has slightly elevated LFTs with the fatty liver we are performing periodic therapeutic phlebotomies. Her ferritin level has been staying within a normal limit. She developed midepigastric pain radiating into her back last week and went to [facility] ED. She reports that CTA of the chest was performed possibly CT of the abdomen. Her D-dimer was elevated at 12. She reports the CTs were negative. She was given Prilosec because it was felt that she was having GERD symptoms that she has history of hiatal hernia. She then developed a rash which possibly may been related to Prilosec which she had taken in the past without complications. She is on a Medrol Dosepak. She continues on metformin with A1c doing well. Blood pressure has been fairly stable on her metoprolol. She is on Celexa stable mood. HEMATOLOGY HISTORY: Heterozygous H63D hereditary hemochromatosis  Sanjay Person was seen in initial hematology consultation on 4/15/2020 referred by GWENDOLYN Masterson for evaluation of her elevated hematocrit. CBC 3/26/2020 revealed a WBC of 5.92 with normal percent differential, Hgb 15.7, HCT 46.1, MCV 89.5, ,000.       CMP 3/26/2020 revealed AST 49 (<32) and ALT 67 (<33) with normal crt of 0.88 with GFR 82.   She reports she subsequently had an ultrasound of her liver at [facility] that revealed a fatty liver.     A prior CBC from 8/7/2019 revealed Hgb 15.6, HCT 46.9.     She had recently been evaluated by Dr. Marilyn Andres as well. She reports that she has had a change in her menstruation- painful over the 2 cycles. Menstrual flow, duration has been the same.     She was told that she could have too much iron on board and was referred for evaluation. She does not take iron replacement therapy. She does have some significant fatigue issues. She also has tachycardia issues but has had that in the past and had it evaluated by Dr. Nia Qiu about 7 years ago. She was previously on Toprol but does not take that anymore.     CBC 3/26/2020 revealed a WBC of 5.92 with normal percent differential, Hgb 15.7, HCT 46.1, MCV 89.5, ,000.     CMP 3/26/2020 revealed AST 49 (<32) and ALT 67 (<33) with normal crt of 0.88 with GFR 82. She reports she subsequently had an ultrasound of her liver at [facility] that revealed a fatty liver.     A prior CBC from 8/7/2019 revealed Hgb 15.6, HCT 46.9.     She had recently been evaluated by Dr. Marilyn Andres as well. She reports that she has had a change in her menstruation- painful over the 2 cycles. Menstrual flow, duration has been the same.     She was told that she could have too much iron on board and was referred for evaluation. She does not take iron replacement therapy.       Initial CBC in the office on 4/18/2020 revealed a WBC of 6.38 with a normal percent differential, Hgb 12.4, MCV 92.9, MCHC 30.6 ,000. Her Hgb is not elevated whatsoever today. Her MCHC is slightly low.     Hepatic ultrasound [facility] 4/1/2020 revealed coarsening of the hepatic echotexture consistent with fatty infiltration with no focal hepatic abnormality.     Serology 4/15/2020  Serum Fe - 88  TIBC - 312  Fe sat - 28.2%  Ferritin - 143  TSH - 2.17      Serology 6/9/2020  Serum Fe - 75  TIBC - 366  Fe sat - 20%  Ferritin - 150 (6.2-137)    Serology 10/2/2020  Serum Fe - 135  TIBC - 460  Fe sat - 29%  Ferritin - 175  CMP - AST 44, ALT 55     Labs from 2021 reveals that her ferritin has increased slightly. Iron saturation also is up to 44%. LFTs only reveal a minimal elevation of ALT at 37 (0-32)    Hemochromatosis panel 2021 -heterozygous H63D mutation    Abdominal ultrasound [facility] 2021 revealed  · Mild diffuse fatty infiltration of the liver  · \"Normal spleen\"    AFP 1.7 on 2021      TREATMENT SUMMARY  1.  Therapeutic phlebotomy 250 cc 2021        Past Medical History:   Diagnosis Date    Heart murmur     Kidney stones         Past Surgical History:   Procedure Laterality Date    BLADDER SURGERY       SECTION      CHOLECYSTECTOMY      HYSTEROSCOPY     [address]    LITHOTRIPSY      UPPER GASTROINTESTINAL ENDOSCOPY             Current Outpatient Medications:     citalopram (CELEXA) 10 MG tablet, , Disp: , Rfl:     methylPREDNISolone (MEDROL DOSEPACK) 4 MG tablet, , Disp: , Rfl:     metFORMIN (GLUCOPHAGE) 500 MG tablet, Take 500 mg by mouth in the morning and at bedtime, Disp: , Rfl:     metoprolol succinate (TOPROL XL) 25 MG extended release tablet, , Disp: , Rfl:      Allergies   Allergen Reactions    Azithromycin Hives    Sulfa Antibiotics      Other reaction(s): Nausea And Vomiting       Social History     Tobacco Use    Smoking status: Never Smoker    Smokeless tobacco: Never Used   Substance Use Topics    Alcohol use: Not Currently    Drug use: Never       Family History   Problem Relation Age of Onset    High Blood Pressure Mother     Osteoporosis Mother     High Cholesterol Mother     High Blood Pressure Father     Stroke Father     Diabetes Father     High Blood Pressure Brother     Other Maternal Grandmother         MDS    Cancer Maternal Grandmother         Skin Cancer    High Blood Pressure Maternal Grandmother     Stroke Maternal Grandmother     Stroke Paternal Grandmother     Heart Disease Paternal Grandmother     Diabetes Paternal Grandmother        Subjective   REVIEW OF SYSTEMS:   Review of Systems   Constitutional: Positive for fatigue (Mild). Negative for chills, diaphoresis, fever and unexpected weight change. HENT: Negative for mouth sores, nosebleeds, sore throat, trouble swallowing and voice change. Eyes: Negative for photophobia, discharge and itching. Respiratory: Negative for cough, shortness of breath and wheezing. Cardiovascular: Negative for chest pain, palpitations and leg swelling. Gastrointestinal: Positive for nausea. Negative for abdominal distention, abdominal pain, blood in stool, constipation, diarrhea and vomiting. Endocrine: Negative for cold intolerance, heat intolerance, polydipsia and polyuria. Genitourinary: Negative for difficulty urinating, dysuria, hematuria, menstrual problem and urgency. Musculoskeletal: Negative for back pain, joint swelling and myalgias. Skin: Positive for rash. Negative for color change. Allergic/Immunologic: Positive for environmental allergies. Neurological: Negative for dizziness, tremors, seizures, syncope and light-headedness. Hematological: Negative for adenopathy. Does not bruise/bleed easily. Psychiatric/Behavioral: Negative for behavioral problems and suicidal ideas. The patient is not nervous/anxious. All other systems reviewed and are negative. Objective   BP (!) 140/98   Pulse (!) 105   Ht 5' 4\" (1.626 m)   Wt 199 lb 1.6 oz (90.3 kg)   SpO2 98%   BMI 34.18 kg/m²     PHYSICAL EXAM:  Physical Exam  Constitutional:       Appearance: She is well-developed. HENT:      Head: Normocephalic and atraumatic. Eyes:      General: No scleral icterus. Conjunctiva/sclera: Conjunctivae normal.   Neck:      Trachea: No tracheal deviation. Cardiovascular:      Rate and Rhythm: Normal rate and regular rhythm. Heart sounds: Normal heart sounds.  No murmur heard.      Pulmonary:      Effort: Pulmonary effort is normal. No respiratory distress. Breath sounds: Normal breath sounds. Abdominal:      General: Bowel sounds are normal. There is no distension. Palpations: Abdomen is soft. There is no fluid wave, hepatomegaly or splenomegaly. Tenderness: There is no abdominal tenderness. Musculoskeletal:         General: No tenderness or deformity. Cervical back: Normal range of motion and neck supple. Skin:     General: Skin is warm and dry. Findings: No rash. Neurological:      Mental Status: She is alert and oriented to person, place, and time. Coordination: Coordination normal.   Psychiatric:         Behavior: Behavior normal.         Thought Content: Thought content normal.       CBC reviewed by me  Lab Results   Component Value Date    WBC 11.87 (H) 06/16/2022    HGB 14.4 06/16/2022    HCT 44.6 06/16/2022    MCV 91.0 06/16/2022     06/16/2022     Lab Results   Component Value Date    NEUTROABS 9.65 (H) 06/16/2022       VISIT DIAGNOSES  1. Hereditary hemochromatosis ([address]    2. Thrombocytopenia ([facility])        ASSESSMENT/PLAN:    1. Heterozygous H63D Hereditary hemochromatosis -     AFP 1.7 on 5/25/2021    Abdominal ultrasound [facility] 6/8/2021 revealed  · Mild diffuse fatty infiltration of the liver  · \"Normal spleen\"            Physical examination today does not reveal any tenderness in the right upper quadrant. PLAN  Recheck iron panel, ferritin, CMP, annual AFP  500 cc therapeutic phlebotomy today  Get CT imaging performed at [facility] to see if the liver was visualized      2. Thrombocytopenia. PLT again normal.    3.  Mild leukocytosis-left shift    WBC today is 11.87 with 81.3% PMN. Most likely related to her Medrol Dosepak. This will be rechecked next visit. HEALTH MAINTENANCE    · Cervical cancer screening. Pap annually per Dr. Alise Fitzgerald    · Breast cancer screening.   Bilateral screening mammogram at [facility] 5/26/2022 was BI-RADS 1      Immunization  She has not had COVID-19. She has no desire to get COVID-19 immunization. I have encouraged COVID-19 vaccination. Orders Placed This Encounter   Procedures    Comprehensive Metabolic Panel    Iron and TIBC    Ferritin    AFP Tumor Marker   · Therapeutic phlebotomy 500 cc today      Return in about 4 months (around 10/16/2022) for With [facility] and Therapeutic phlebotomy.      Mazin Montgomery PA-C  9:20 AM  6/16/2022

## 2022-06-16 ENCOUNTER — OFFICE VISIT (OUTPATIENT)
Dept: HEMATOLOGY | Age: 42
End: 2022-06-16
Payer: COMMERCIAL

## 2022-06-16 ENCOUNTER — HOSPITAL ENCOUNTER (OUTPATIENT)
Dept: INFUSION THERAPY | Age: 42
Discharge: HOME | End: 2022-06-16
Payer: COMMERCIAL

## 2022-06-16 VITALS
BODY MASS INDEX: 33.99 KG/M2 | OXYGEN SATURATION: 98 % | WEIGHT: 199.1 LBS | SYSTOLIC BLOOD PRESSURE: 140 MMHG | HEIGHT: 64 IN | HEART RATE: 105 BPM | DIASTOLIC BLOOD PRESSURE: 98 MMHG

## 2022-06-16 DIAGNOSIS — E83.110 HEREDITARY HEMOCHROMATOSIS (HCC): Primary | ICD-10-CM

## 2022-06-16 DIAGNOSIS — E83.110 HEREDITARY HEMOCHROMATOSIS (HCC): ICD-10-CM

## 2022-06-16 DIAGNOSIS — D69.6 THROMBOCYTOPENIA (HCC): ICD-10-CM

## 2022-06-16 DIAGNOSIS — D72.829 LEUKOCYTOSIS, UNSPECIFIED TYPE: ICD-10-CM

## 2022-06-16 LAB
ALBUMIN SERPL-MCNC: 4.1 G/DL (ref 3.5–5.2)
ALP BLD-CCNC: 57 U/L (ref 35–104)
ALPHA FETOPROTEIN: 1.7 NG/ML (ref 0–8.3)
ALT SERPL-CCNC: 34 U/L (ref 9–52)
ANION GAP SERPL CALCULATED.3IONS-SCNC: 11 MMOL/L (ref 7–19)
AST SERPL-CCNC: 36 U/L (ref 14–36)
BASOPHILS ABSOLUTE: 0.01 K/UL (ref 0.01–0.08)
BASOPHILS RELATIVE PERCENT: 0.1 % (ref 0.1–1.2)
BILIRUB SERPL-MCNC: 0.8 MG/DL (ref 0.2–1.3)
BUN BLDV-MCNC: 12 MG/DL (ref 7–17)
CALCIUM SERPL-MCNC: 8.9 MG/DL (ref 8.4–10.2)
CHLORIDE BLD-SCNC: 102 MMOL/L (ref 98–111)
CO2: 24 MMOL/L (ref 22–29)
CREAT SERPL-MCNC: <0.5 MG/DL (ref 0.5–1)
EOSINOPHILS ABSOLUTE: 0.02 K/UL (ref 0.04–0.54)
EOSINOPHILS RELATIVE PERCENT: 0.2 % (ref 0.7–7)
GFR NON-AFRICAN AMERICAN: >60
GLUCOSE BLD-MCNC: 160 MG/DL (ref 74–106)
HCT VFR BLD CALC: 44.6 % (ref 34.1–44.9)
HEMOGLOBIN: 14.4 G/DL (ref 11.2–15.7)
LYMPHOCYTES ABSOLUTE: 1.8 K/UL (ref 1.18–3.74)
LYMPHOCYTES RELATIVE PERCENT: 15.2 % (ref 19.3–53.1)
MCH RBC QN AUTO: 29.4 PG (ref 25.6–32.2)
MCHC RBC AUTO-ENTMCNC: 32.3 G/DL (ref 32.3–35.5)
MCV RBC AUTO: 91 FL (ref 79.4–94.8)
MONOCYTES ABSOLUTE: 0.33 K/UL (ref 0.24–0.82)
MONOCYTES RELATIVE PERCENT: 2.8 % (ref 4.7–12.5)
NEUTROPHILS ABSOLUTE: 9.65 K/UL (ref 1.56–6.13)
NEUTROPHILS RELATIVE PERCENT: 81.2 % (ref 34–71.1)
PDW BLD-RTO: 13.2 % (ref 11.7–14.4)
PLATELET # BLD: 204 K/UL (ref 182–369)
PMV BLD AUTO: 10.7 FL (ref 7.4–10.4)
POTASSIUM SERPL-SCNC: 4.9 MMOL/L (ref 3.5–5.1)
RBC # BLD: 4.9 M/UL (ref 3.93–5.22)
REASON FOR REJECTION: NORMAL
REJECTED TEST: NORMAL
SODIUM BLD-SCNC: 137 MMOL/L (ref 137–145)
TOTAL PROTEIN: 7.4 G/DL (ref 6.3–8.2)
WBC # BLD: 11.87 K/UL (ref 3.98–10.04)

## 2022-06-16 PROCEDURE — 99195 PHLEBOTOMY: CPT

## 2022-06-16 PROCEDURE — 99212 OFFICE O/P EST SF 10 MIN: CPT

## 2022-06-16 PROCEDURE — 36415 COLL VENOUS BLD VENIPUNCTURE: CPT

## 2022-06-16 PROCEDURE — 99213 OFFICE O/P EST LOW 20 MIN: CPT | Performed by: PHYSICIAN ASSISTANT

## 2022-06-16 PROCEDURE — 85025 COMPLETE CBC W/AUTO DIFF WBC: CPT

## 2022-06-16 PROCEDURE — 80053 COMPREHEN METABOLIC PANEL: CPT

## 2022-06-16 RX ORDER — 0.9 % SODIUM CHLORIDE 0.9 %
250 INTRAVENOUS SOLUTION INTRAVENOUS ONCE
OUTPATIENT
Start: 2022-06-16 | End: 2022-06-16

## 2022-06-16 RX ORDER — 0.9 % SODIUM CHLORIDE 0.9 %
500 INTRAVENOUS SOLUTION INTRAVENOUS ONCE
OUTPATIENT
Start: 2022-06-16 | End: 2022-06-16

## 2022-06-16 RX ORDER — METHYLPREDNISOLONE 4 MG/1
TABLET ORAL
COMMUNITY
Start: 2022-06-14 | End: 2022-10-20

## 2022-06-16 RX ORDER — CITALOPRAM 10 MG/1
TABLET ORAL
COMMUNITY
Start: 2022-06-13

## 2022-06-16 ASSESSMENT — ENCOUNTER SYMPTOMS
CONSTIPATION: 0
COUGH: 0
DIARRHEA: 0
PHOTOPHOBIA: 0
VOICE CHANGE: 0
BLOOD IN STOOL: 0
VOMITING: 0
EYE DISCHARGE: 0
EYE ITCHING: 0
NAUSEA: 1
WHEEZING: 0
TROUBLE SWALLOWING: 0
SHORTNESS OF BREATH: 0
BACK PAIN: 0
ABDOMINAL DISTENTION: 0
SORE THROAT: 0
COLOR CHANGE: 0
ABDOMINAL PAIN: 0

## 2022-06-16 NOTE — PROGRESS NOTES
THERAPEUTIC PHLEBOTOMY    Most recent Hemoglobin 14.4Gm/dl and Hematocrit 44.6%    Date obtained: 6 /16 /2022  Most recent Ferritin level 305.5 (If applicable)  Date obtained: 2 /17 /2022    Pre-phlebotomy Vital Signs: Refer to Doc flow sheet    Start time: 1000    Tourniquet placed on patient's arm and arm palpated for venous access. Area of venous access cleansed with alcohol. Sheath removed from the needle and needle inserted into the right antecubital site. Blood flowing well down the tubing into collection bag. Adjustment/no adjustment of needle needed to maintain adequate blood flow. Scale monitoring amount of blood in bag. Stop Time: 1024  Needle removed from patient's arm. Pressure applied to patient's arm until bleeding stopped. Dry sterile dressing applied over puncture site and secured with Coban/self-adherent wrap. Final amount of blood removed: 451      Patient tolerated procedure well. No redness, edema, or signs of active bleeding noted. Discharge Instructions provided: No heavy pushing or lifting for the next 24 hours. Keep dressing dry and in place for 4 to 6 hours. If a fever GREATER than 100.5 develops, contact office. Patient discharged ambulatory with belongings.

## 2022-10-18 ENCOUNTER — PREP FOR SURGERY (OUTPATIENT)
Dept: OTHER | Facility: HOSPITAL | Age: 42
End: 2022-10-18

## 2022-10-18 DIAGNOSIS — K22.2 STRICTURE AND STENOSIS OF ESOPHAGUS: Primary | ICD-10-CM

## 2022-10-18 RX ORDER — DEXTROSE AND SODIUM CHLORIDE 5; .45 G/100ML; G/100ML
30 INJECTION, SOLUTION INTRAVENOUS CONTINUOUS PRN
Status: CANCELLED | OUTPATIENT
Start: 2022-11-01

## 2022-10-19 PROBLEM — K22.2 STRICTURE AND STENOSIS OF ESOPHAGUS: Status: ACTIVE | Noted: 2022-10-19

## 2022-10-19 NOTE — PROGRESS NOTES
Progress Note      Pt Name: Desi Whelan  YOB: 1980  MRN: [id number]    Date of evaluation: 10/20/2022  History Obtained From:  patient, electronic medical record    CHIEF COMPLAINT:    Chief Complaint   Patient presents with    Follow-up     Hereditary hemochrom[address] [address]     Current active problems  Heterozygous H63D hereditary hemochromatosis  Hepatic steatosis    HISTORY OF PRESENT ILLNESS:    Desi Whelan is a 36 y.o.  female with a diagnosis of heterozygous H63D hereditary hemochromatosis followed in the office since 4/15/2020. She also has a known fatty liver. She has mild elevation of her transaminases, with that combination she does receive therapeutic phlebotomies periodically. Since her last visit here she had endoscopy x2 by Dr. Shannon Fernandes at [facility] which revealed a gastric ulcer. The ulcer was healing on the second endoscopy. She also required esophageal stricture dilatation both procedures. Dr. Shannon Fernandes encouraged her to get surgery for gastric reflux. However she wanted a second opinion and has been evaluated by Dr. Karuna Galeas proceed gastroenterology) at [facility]. She has follow-up scheduled. She continues taking Pepcid 20 twice daily and Carafate. She cannot take PPI-she had a significant rash with omeprazole in the past.    She continues on metformin 500 twice daily with A1c doing well. Blood pressure stable on metoprolol 25 daily. She continues on Celexa 10 daily. HEMATOLOGY HISTORY: Heterozygous H63D hereditary hemochromatosis  Anita Valente was seen in initial hematology consultation on 4/15/2020 referred by GWENDOLYN Santoro for evaluation of her elevated hematocrit. CBC 3/26/2020 revealed a WBC of 5.92 with normal percent differential, Hgb 15.7, HCT 46.1, MCV 89.5, ,000. CMP 3/26/2020 revealed AST 49 (<32) and ALT 67 (<33) with normal crt of 0.88 with GFR 82.   She reports she subsequently had an ultrasound of her liver at [facility] that revealed a fatty liver. A prior CBC from 8/7/2019 revealed Hgb 15.6, HCT 46.9. She had recently been evaluated by Dr. Todd Connors as well. She reports that she has had a change in her menstruation- painful over the 2 cycles. Menstrual flow, duration has been the same. She was told that she could have too much iron on board and was referred for evaluation. She does not take iron replacement therapy. She does have some significant fatigue issues. She also has tachycardia issues but has had that in the past and had it evaluated by Dr. Jimmie Tejeda about 7 years ago. She was previously on Toprol but does not take that anymore. CBC 3/26/2020 revealed a WBC of 5.92 with normal percent differential, Hgb 15.7, HCT 46.1, MCV 89.5, ,000. CMP 3/26/2020 revealed AST 49 (<32) and ALT 67 (<33) with normal crt of 0.88 with GFR 82. She reports she subsequently had an ultrasound of her liver at [facility] that revealed a fatty liver. A prior CBC from 8/7/2019 revealed Hgb 15.6, HCT 46.9. She had recently been evaluated by Dr. Todd Connors as well. She reports that she has had a change in her menstruation- painful over the 2 cycles. Menstrual flow, duration has been the same. She was told that she could have too much iron on board and was referred for evaluation. She does not take iron replacement therapy. Initial CBC in the office on 4/18/2020 revealed a WBC of 6.38 with a normal percent differential, Hgb 12.4, MCV 92.9, MCHC 30.6 ,000. Her Hgb is not elevated whatsoever today. Her MCHC is slightly low. Hepatic ultrasound [facility] 4/1/2020 revealed coarsening of the hepatic echotexture consistent with fatty infiltration with no focal hepatic abnormality.     Serology 4/15/2020  Serum Fe - 88  TIBC - 312  Fe sat - 28.2%  Ferritin - 143  TSH - 2.17      Serology 6/9/2020  Serum Fe - 75  TIBC - 366  Fe sat - 20%  Ferritin - 150 (6.2-137)    Serology 10/2/2020  Serum Fe - 135  TIBC - 460  Fe sat - 29%  Ferritin - 175  CMP - AST 44, ALT 55     Labs from 2021 reveals that her ferritin has increased slightly. Iron saturation also is up to 44%.   LFTs only reveal a minimal elevation of ALT at 37 (0-32)    Hemochromatosis panel 2021 -heterozygous H63D mutation    Abdominal ultrasound [facility] 2021 revealed  Mild diffuse fatty infiltration of the liver  \"Normal spleen\"    AFP 1.7 on 2021      TREATMENT SUMMARY  Therapeutic phlebotomy 250 cc 2021        Past Medical History:   Diagnosis Date    Heart murmur     Kidney stones         Past Surgical History:   Procedure Laterality Date    BLADDER SURGERY       SECTION      CHOLECYSTECTOMY      HYSTEROSCOPY      [address]    LITHOTRIPSY      UPPER GASTROINTESTINAL ENDOSCOPY             Current Outpatient Medications:     sucralfate (CARAFATE) 1 GM tablet, , Disp: , Rfl:     famotidine (PEPCID) 20 MG tablet, , Disp: , Rfl:     citalopram (CELEXA) 10 MG tablet, , Disp: , Rfl:     metFORMIN (GLUCOPHAGE) 500 MG tablet, Take 500 mg by mouth in the morning and at bedtime, Disp: , Rfl:     metoprolol succinate (TOPROL XL) 25 MG extended release tablet, , Disp: , Rfl:      Allergies   Allergen Reactions    Azithromycin Hives    Sulfa Antibiotics      Other reaction(s): Nausea And Vomiting       Social History     Tobacco Use    Smoking status: Never    Smokeless tobacco: Never   Substance Use Topics    Alcohol use: Not Currently    Drug use: Never       Family History   Problem Relation Age of Onset    High Blood Pressure Mother     Osteoporosis Mother     High Cholesterol Mother     High Blood Pressure Father     Stroke Father     Diabetes Father     High Blood Pressure Brother     Other Maternal Grandmother         MDS    Cancer Maternal Grandmother         Skin Cancer    High Blood Pressure Maternal Grandmother     Stroke Maternal Grandmother     Stroke Paternal Grandmother     Heart Disease Paternal Grandmother

## 2022-10-20 ENCOUNTER — HOSPITAL ENCOUNTER (OUTPATIENT)
Dept: INFUSION THERAPY | Age: 42
Discharge: HOME | End: 2022-10-20
Payer: COMMERCIAL

## 2022-10-20 ENCOUNTER — OFFICE VISIT (OUTPATIENT)
Dept: HEMATOLOGY | Age: 42
End: 2022-10-20
Payer: COMMERCIAL

## 2022-10-20 VITALS
HEART RATE: 83 BPM | DIASTOLIC BLOOD PRESSURE: 76 MMHG | BODY MASS INDEX: 33.82 KG/M2 | HEIGHT: 64 IN | SYSTOLIC BLOOD PRESSURE: 132 MMHG | OXYGEN SATURATION: 98 % | WEIGHT: 198.1 LBS

## 2022-10-20 DIAGNOSIS — D69.6 THROMBOCYTOPENIA (HCC): ICD-10-CM

## 2022-10-20 DIAGNOSIS — E83.110 HEREDITARY HEMOCHROMATOSIS (HCC): Primary | ICD-10-CM

## 2022-10-20 DIAGNOSIS — D72.829 LEUKOCYTOSIS, UNSPECIFIED TYPE: ICD-10-CM

## 2022-10-20 DIAGNOSIS — E83.110 HEREDITARY HEMOCHROMATOSIS (HCC): ICD-10-CM

## 2022-10-20 LAB
ALBUMIN SERPL-MCNC: 4.2 G/DL (ref 3.5–5.2)
ALP BLD-CCNC: 82 U/L (ref 35–104)
ALT SERPL-CCNC: 59 U/L (ref 9–52)
ANION GAP SERPL CALCULATED.3IONS-SCNC: 9 MMOL/L (ref 7–19)
AST SERPL-CCNC: 43 U/L (ref 14–36)
BASOPHILS ABSOLUTE: 0.04 K/UL (ref 0.01–0.08)
BASOPHILS RELATIVE PERCENT: 0.6 % (ref 0.1–1.2)
BILIRUB SERPL-MCNC: 0.6 MG/DL (ref 0.2–1.3)
BUN BLDV-MCNC: 10 MG/DL (ref 7–17)
CALCIUM SERPL-MCNC: 8.9 MG/DL (ref 8.4–10.2)
CHLORIDE BLD-SCNC: 102 MMOL/L (ref 98–111)
CO2: 28 MMOL/L (ref 22–29)
CREAT SERPL-MCNC: 0.7 MG/DL (ref 0.5–1)
EOSINOPHILS ABSOLUTE: 0.2 K/UL (ref 0.04–0.54)
EOSINOPHILS RELATIVE PERCENT: 2.8 % (ref 0.7–7)
FERRITIN: 65.6 NG/ML (ref 13–150)
GFR SERPL CREATININE-BSD FRML MDRD: >60 ML/MIN/{1.73_M2}
GLOBULIN: 2.9 G/DL
GLUCOSE BLD-MCNC: 100 MG/DL (ref 74–106)
HCT VFR BLD CALC: 48.2 % (ref 34.1–44.9)
HEMOGLOBIN: 14.9 G/DL (ref 11.2–15.7)
IRON SATURATION: 27 % (ref 14–50)
IRON: 74 UG/DL (ref 37–145)
LYMPHOCYTES ABSOLUTE: 2.15 K/UL (ref 1.18–3.74)
LYMPHOCYTES RELATIVE PERCENT: 29.9 % (ref 19.3–53.1)
MCH RBC QN AUTO: 28.8 PG (ref 25.6–32.2)
MCHC RBC AUTO-ENTMCNC: 30.9 G/DL (ref 32.3–35.5)
MCV RBC AUTO: 93.1 FL (ref 79.4–94.8)
MONOCYTES ABSOLUTE: 0.59 K/UL (ref 0.24–0.82)
MONOCYTES RELATIVE PERCENT: 8.2 % (ref 4.7–12.5)
NEUTROPHILS ABSOLUTE: 4.19 K/UL (ref 1.56–6.13)
NEUTROPHILS RELATIVE PERCENT: 58.2 % (ref 34–71.1)
PDW BLD-RTO: 13.8 % (ref 11.7–14.4)
PLATELET # BLD: 195 K/UL (ref 182–369)
PMV BLD AUTO: 10.9 FL (ref 7.4–10.4)
POTASSIUM SERPL-SCNC: 4.3 MMOL/L (ref 3.5–5.1)
RBC # BLD: 5.18 M/UL (ref 3.93–5.22)
SODIUM BLD-SCNC: 139 MMOL/L (ref 137–145)
TOTAL IRON BINDING CAPACITY: 274 UG/DL (ref 250–400)
TOTAL PROTEIN: 7.1 G/DL (ref 6.3–8.2)
WBC # BLD: 7.19 K/UL (ref 3.98–10.04)

## 2022-10-20 PROCEDURE — 99213 OFFICE O/P EST LOW 20 MIN: CPT | Performed by: PHYSICIAN ASSISTANT

## 2022-10-20 PROCEDURE — 99212 OFFICE O/P EST SF 10 MIN: CPT

## 2022-10-20 PROCEDURE — 36415 COLL VENOUS BLD VENIPUNCTURE: CPT

## 2022-10-20 PROCEDURE — 85025 COMPLETE CBC W/AUTO DIFF WBC: CPT

## 2022-10-20 PROCEDURE — 80053 COMPREHEN METABOLIC PANEL: CPT

## 2022-10-20 PROCEDURE — 99195 PHLEBOTOMY: CPT

## 2022-10-20 RX ORDER — 0.9 % SODIUM CHLORIDE 0.9 %
500 INTRAVENOUS SOLUTION INTRAVENOUS ONCE
OUTPATIENT
Start: 2022-10-20 | End: 2022-10-20

## 2022-10-20 RX ORDER — SUCRALFATE 1 G/1
TABLET ORAL
COMMUNITY
Start: 2022-10-03

## 2022-10-20 RX ORDER — 0.9 % SODIUM CHLORIDE 0.9 %
250 INTRAVENOUS SOLUTION INTRAVENOUS ONCE
OUTPATIENT
Start: 2022-10-20 | End: 2022-10-20

## 2022-10-20 RX ORDER — FAMOTIDINE 20 MG/1
TABLET, FILM COATED ORAL
COMMUNITY
Start: 2022-10-03

## 2022-10-20 NOTE — PROGRESS NOTES
THERAPEUTIC PHLEBOTOMY    Most recent Hemoglobin 14.9 Gm/dl and Hematocrit 48.2%    Date obtained: 10 /20 /2022      Pre-phlebotomy Vital Signs: Refer to Doc flow sheet    Start time: 950    Tourniquet placed on patient's arm and arm palpated for venous access. Area of venous access cleansed with alcohol. Sheath removed from the needle and needle inserted into the right antecubital site. Blood flowing well down the tubing into collection bag. Adjustment/no adjustment of needle needed to maintain adequate blood flow. Scale monitoring amount of blood in bag. Stop Time: 1930  Needle removed from patient's arm. Pressure applied to patient's arm until bleeding stopped. Dry sterile dressing applied over puncture site and secured with Coban/self-adherent wrap. Final amount of blood removed: 500      Patient tolerated procedure well. No redness, edema, or signs of active bleeding noted. Discharge Instructions provided: No heavy pushing or lifting for the next 24 hours. Keep dressing dry and in place for 4 to 6 hours. If a fever GREATER than 100.5 develops, contact office. Patient discharged ambulatory with belongings.

## 2022-10-28 RX ORDER — SUCRALFATE 1 G/1
1 TABLET ORAL 4 TIMES DAILY
COMMUNITY

## 2022-10-28 RX ORDER — METOPROLOL SUCCINATE 25 MG/1
25 TABLET, EXTENDED RELEASE ORAL NIGHTLY
COMMUNITY

## 2022-10-28 RX ORDER — FAMOTIDINE 20 MG/1
20 TABLET, FILM COATED ORAL 2 TIMES DAILY
COMMUNITY

## 2022-10-28 RX ORDER — CITALOPRAM 10 MG/1
10 TABLET ORAL NIGHTLY
COMMUNITY

## 2022-11-01 ENCOUNTER — ANESTHESIA EVENT (OUTPATIENT)
Dept: GASTROENTEROLOGY | Facility: HOSPITAL | Age: 42
End: 2022-11-01

## 2022-11-01 ENCOUNTER — ANESTHESIA (OUTPATIENT)
Dept: GASTROENTEROLOGY | Facility: HOSPITAL | Age: 42
End: 2022-11-01

## 2022-11-01 ENCOUNTER — HOSPITAL ENCOUNTER (OUTPATIENT)
Facility: HOSPITAL | Age: 42
Setting detail: HOSPITAL OUTPATIENT SURGERY
Discharge: HOME | End: 2022-11-01
Attending: INTERNAL MEDICINE | Admitting: INTERNAL MEDICINE

## 2022-11-01 VITALS
OXYGEN SATURATION: 98 % | TEMPERATURE: 97.3 F | SYSTOLIC BLOOD PRESSURE: 112 MMHG | DIASTOLIC BLOOD PRESSURE: 70 MMHG | WEIGHT: 199 LBS | HEIGHT: 64 IN | HEART RATE: 88 BPM | RESPIRATION RATE: 18 BRPM | BODY MASS INDEX: 33.97 KG/M2

## 2022-11-01 DIAGNOSIS — K22.2 STRICTURE AND STENOSIS OF ESOPHAGUS: ICD-10-CM

## 2022-11-01 LAB — GLUCOSE BLDC GLUCOMTR-MCNC: 83 MG/DL (ref 70–130)

## 2022-11-01 PROCEDURE — 87071 CULTURE AEROBIC QUANT OTHER: CPT | Performed by: INTERNAL MEDICINE

## 2022-11-01 PROCEDURE — 82962 GLUCOSE BLOOD TEST: CPT

## 2022-11-01 PROCEDURE — 88342 IMHCHEM/IMCYTCHM 1ST ANTB: CPT

## 2022-11-01 PROCEDURE — 25010000002 PROPOFOL 10 MG/ML EMULSION: Performed by: NURSE ANESTHETIST, CERTIFIED REGISTERED

## 2022-11-01 PROCEDURE — 88305 TISSUE EXAM BY PATHOLOGIST: CPT

## 2022-11-01 RX ORDER — DEXTROSE AND SODIUM CHLORIDE 5; .45 G/100ML; G/100ML
30 INJECTION, SOLUTION INTRAVENOUS CONTINUOUS PRN
Status: DISCONTINUED | OUTPATIENT
Start: 2022-11-01 | End: 2022-11-01 | Stop reason: HOSPADM

## 2022-11-01 RX ORDER — LIDOCAINE HYDROCHLORIDE 20 MG/ML
INJECTION, SOLUTION INTRAVENOUS AS NEEDED
Status: DISCONTINUED | OUTPATIENT
Start: 2022-11-01 | End: 2022-11-01 | Stop reason: SURG

## 2022-11-01 RX ORDER — PROPOFOL 10 MG/ML
VIAL (ML) INTRAVENOUS AS NEEDED
Status: DISCONTINUED | OUTPATIENT
Start: 2022-11-01 | End: 2022-11-01 | Stop reason: SURG

## 2022-11-01 RX ADMIN — PROPOFOL 50 MG: 10 INJECTION, EMULSION INTRAVENOUS at 11:48

## 2022-11-01 RX ADMIN — DEXTROSE AND SODIUM CHLORIDE 30 ML/HR: 5; 450 INJECTION, SOLUTION INTRAVENOUS at 10:58

## 2022-11-01 RX ADMIN — LIDOCAINE HYDROCHLORIDE 60 MG: 20 INJECTION, SOLUTION INTRAVENOUS at 11:45

## 2022-11-01 RX ADMIN — PROPOFOL 120 MG: 10 INJECTION, EMULSION INTRAVENOUS at 11:45

## 2022-11-01 NOTE — ANESTHESIA PREPROCEDURE EVALUATION
Anesthesia Evaluation     Patient summary reviewed and Nursing notes reviewed   NPO Solid Status: > 8 hours  NPO Liquid Status: > 8 hours           Airway   Mallampati: II  TM distance: >3 FB  Neck ROM: full  No difficulty expected  Dental      Pulmonary - normal exam   Cardiovascular - normal exam    Beta blocker given within 24 hours of surgery    (+) dysrhythmias Tachycardia,       Neuro/Psych  GI/Hepatic/Renal/Endo    (+) obesity,  hiatal hernia, GERD,  liver disease fatty liver disease, renal disease stones, diabetes mellitus type 2 well controlled,     ROS Comment: Esophageal stricture    Musculoskeletal     Abdominal   (+) obese,    Substance History      OB/GYN          Other                        Anesthesia Plan    ASA 3     general   total IV anesthesia    Anesthetic plan, risks, benefits, and alternatives have been provided, discussed and informed consent has been obtained with: patient.        CODE STATUS:

## 2022-11-01 NOTE — H&P
Jazzy Dyer DO,[facility]  Gastroenterology  Hepatology  Endoscopy  Board Certified in Internal Medicine and gastroenterology  [address], [address]  [place]. [address]  - [phone]   F - [phone]     GASTROENTEROLOGY HISTORY AND PHYSICAL  NOTE   JAZZY DYER DO.         SUBJECTIVE:   2022    Name: Neelima Gonzalez  DOD: 1980        Chief Complaint:       Subjective : Acid reflux.  Dysphagia.  Concern regarding the possibility of needing fundoplication or TIF surgery    Patient is 42 y.o. female presents with desire for elective EGD with biopsy as required and dilation as required.      ROS/HISTORY/ CURRENT MEDICATIONS/OBJECTIVE/VS/PE:   Review of Systems:  All systems unremarkable unless specified below.  Constitutional   HENT  Eyes   Respiratory    Cardiovascular  Gastrointestinal   Endocrine  Genitourinary    Musculoskeletal   Skin  Allergic/Immunologic    Neurological    Hematological  Psychiatric/Behavioral    History:     Past Medical History:   Diagnosis Date   • Chickenpox    • Diabetes mellitus (HCC)    • Fatty liver    • GERD (gastroesophageal reflux disease)    • Hiatal hernia    • HPV in female    • Hypertension    • Kidney stones    • Spasm of GI tract    • Tachycardia      Past Surgical History:   Procedure Laterality Date   •  SECTION     • CHOLECYSTECTOMY     • EXTRACORPOREAL SHOCK WAVE LITHOTRIPSY (ESWL)     • KNEE ARTHROSCOPY Right    • URETER SURGERY  1980     History reviewed. No pertinent family history.  Social History     Tobacco Use   • Smoking status: Never   • Smokeless tobacco: Never   Vaping Use   • Vaping Use: Never used   Substance Use Topics   • Alcohol use: No   • Drug use: No     Prior to Admission medications    Medication Sig Start Date End Date Taking? Authorizing Provider   citalopram (CeleXA) 10 MG tablet Take 1 tablet by mouth Every Night.   Yes Provider, MD Radha   famotidine (PEPCID) 20 MG tablet Take 1 tablet by  mouth 2 (Two) Times a Day.   Yes Radha Stafford MD   metFORMIN (GLUCOPHAGE) 500 MG tablet Take 1 tablet by mouth 2 (Two) Times a Day With Meals.   Yes Radha Stafford MD   metoprolol succinate XL (TOPROL-XL) 25 MG 24 hr tablet Take 1 tablet by mouth Every Night.   Yes Radha Stafford MD   sucralfate (CARAFATE) 1 g tablet Take 1 tablet by mouth 4 (Four) Times a Day.   Yes Radha Stafford MD     Allergies:  Sulfa antibiotics and Zithromax [azithromycin]    I have reviewed the patients medical history, surgical history and family history in the available medical record system.     Current Medications:     Current Facility-Administered Medications   Medication Dose Route Frequency Provider Last Rate Last Admin   • dextrose 5 % and sodium chloride 0.45 % infusion  30 mL/hr Intravenous Continuous PRN Hayden Strauss DO 30 mL/hr at 11/01/22 1058 30 mL/hr at 11/01/22 1058       Objective     Physical Exam:   Temp:  [97.5 °F (36.4 °C)] 97.5 °F (36.4 °C)  Heart Rate:  [67] 67  Resp:  [20] 20  BP: (141)/(92) 141/92    Physical Exam:  General Appearance:    Alert, cooperative, in no acute distress   Head:    Normocephalic, without obvious abnormality, atraumatic   Eyes:            Lids and lashes normal, conjunctivae and sclerae normal, no icterus, no pallor, corneas clear, PERRLA   Ears:    Ears appear intact with no abnormalities noted   Throat:   No oral lesions, no thrush, oral mucosa moist   Neck:   No adenopathy, supple, trachea midline, no thyromegaly, no  carotid bruit, no JVD   Back:     No kyphosis present, no scoliosis present, no skin lesions,   erythema or scars, no tenderness to percussion or                 palpation,  range of motion normal   Lungs:     Clear to auscultation,respirations regular, even and         unlabored    Heart:    Regular rhythm and normal rate, normal S1 and S2, no  murmur, no gallop, no rub, no click   Breast Exam:    Deferred   Abdomen:     Normal bowel  sounds, no masses, no organomegaly, soft  nontender, nondistended, no guarding, no rebound                 tenderness   Genitalia:    Deferred   Extremities:   Moves all extremities well, no edema, no cyanosis, no          redness   Pulses:   Pulses palpable and equal bilaterally   Skin:   No bleeding, bruising or rash   Lymph nodes:   No palpable adenopathy   Neurologic:   Cranial nerves 2 - 12 grossly intact, sensation intact, DTR     present and equal bilaterally      Results Review:     Lab Results   Component Value Date    WBC 7.19 10/20/2022    WBC 11.87 (H) 06/16/2022    WBC 8.10 02/17/2022    HGB 14.9 10/20/2022    HGB 14.4 06/16/2022    HGB 14.2 02/17/2022    HCT 48.2 (H) 10/20/2022    HCT 44.6 06/16/2022    HCT 43.9 02/17/2022     10/20/2022     06/16/2022     02/17/2022             No results found for: LIPASE  No results found for: INR  No results found for: THROATCX    Radiology Review:  Imaging Results (Last 72 Hours)     ** No results found for the last 72 hours. **           I reviewed the patient's new clinical results.  I reviewed the patient's new imaging results and agree with the interpretation.     ASSESSMENT/PLAN:   ASSESSMENT:  1.  Acid reflux  2.  Esophageal stricture  3.  Hiatal hernia    PLAN:  1.  Esophagogastroduodenoscopy with biopsy and possible dilation    Risk and benefits associated with the procedure are reviewed with the patient.  The patient wished to proceed     Hayden Strauss DO  11/01/22  11:33 CDT

## 2022-11-01 NOTE — ANESTHESIA POSTPROCEDURE EVALUATION
Patient: Neelima Gonzalez    Procedure Summary     Date: 11/01/22 Room / Location: [facility] ENDOSCOPY 2 / [facility] ENDOSCOPY    Anesthesia Start: 1140 Anesthesia Stop: 1152    Procedure: ESOPHAGOGASTRODUODENOSCOPY 11:30 Diagnosis:       Stricture and stenosis of esophagus      (Stricture and stenosis of esophagus [K22.2])    Surgeons: Hayden Strauss DO Provider: Nanci Diego CRNA    Anesthesia Type: general ASA Status: 3          Anesthesia Type: general    Vitals  No vitals data found for the desired time range.          Post Anesthesia Care and Evaluation    Patient location during evaluation: bedside  Patient participation: complete - patient participated  Level of consciousness: sleepy but conscious  Pain score: 0  Pain management: adequate    Airway patency: patent  Anesthetic complications: No anesthetic complications  PONV Status: none  Cardiovascular status: hemodynamically stable  Respiratory status: spontaneous ventilation and room air  Hydration status: acceptable    Comments: 144/88 95 16 97%

## 2022-11-01 NOTE — ADDENDUM NOTE
Addendum  created 11/01/22 1431 by Nanci Diego, CRNA    Review and Sign - Ready for Procedure, Review and Sign - Signed

## 2022-11-03 LAB — BACTERIA SPEC AEROBE CULT: NORMAL

## 2022-11-04 LAB — REF LAB TEST METHOD: NORMAL

## 2023-04-19 ENCOUNTER — TELEPHONE (OUTPATIENT)
Dept: HEMATOLOGY | Age: 43
End: 2023-04-19

## 2023-04-19 NOTE — PROGRESS NOTES
Progress Note      Pt Name: Estella Chavez  YOB: 1980  MRN: [id number]    Date of evaluation: 4/20/2023  History Obtained From:  patient, electronic medical record    CHIEF COMPLAINT:    Chief Complaint   Patient presents with    Follow-up     Hereditary hemochromatosis      Current active problems  Heterozygous H63D hereditary hemochromatosis  Hepatic steatosis    HISTORY OF PRESENT ILLNESS:    Estella Chavez is a 36 y.o.  female with a diagnosis of heterozygous H63D hereditary hemochromatosis followed in the office since 4/15/2020. She also has a known fatty liver. Her CMP did show mild elevation of her transaminases and she received therapeutic problems periodically. She has tolerated phlebotomies well. She denies any new medical issues. He continues taking Protonix 40 twice daily for severe GERD symptoms and history of gastric ulcers. She was evaluated by Dr. Angelique Torres who recommended surgery for GERD. He is followed by Dr. David Neal at [facility]. She continues on metformin 500 twice daily with A1c most recently 7.something. Blood pressure stable on metoprolol 25 daily. She continues on Celexa 10 daily with her anxiety and well-controlled. HEMATOLOGY HISTORY: Heterozygous H63D hereditary hemochromatosis  Kyle Ferrer was seen in initial hematology consultation on 4/15/2020 referred by GWENDOLYN Cano for evaluation of her elevated hematocrit. CBC 3/26/2020 revealed a WBC of 5.92 with normal percent differential, Hgb 15.7, HCT 46.1, MCV 89.5, ,000. CMP 3/26/2020 revealed AST 49 (<32) and ALT 67 (<33) with normal crt of 0.88 with GFR 82. She reports she subsequently had an ultrasound of her liver at [facility] that revealed a fatty liver. A prior CBC from 8/7/2019 revealed Hgb 15.6, HCT 46.9. She had recently been evaluated by Dr. Nora Montiel as well. She reports that she has had a change in her menstruation- painful over the 2 cycles.   Menstrual

## 2023-04-20 ENCOUNTER — HOSPITAL ENCOUNTER (OUTPATIENT)
Dept: INFUSION THERAPY | Age: 43
Discharge: HOME | End: 2023-04-20
Payer: COMMERCIAL

## 2023-04-20 ENCOUNTER — OFFICE VISIT (OUTPATIENT)
Dept: HEMATOLOGY | Age: 43
End: 2023-04-20
Payer: COMMERCIAL

## 2023-04-20 VITALS
BODY MASS INDEX: 36.43 KG/M2 | SYSTOLIC BLOOD PRESSURE: 130 MMHG | OXYGEN SATURATION: 98 % | RESPIRATION RATE: 16 BRPM | WEIGHT: 213.4 LBS | TEMPERATURE: 98.6 F | DIASTOLIC BLOOD PRESSURE: 67 MMHG | HEIGHT: 64 IN

## 2023-04-20 DIAGNOSIS — E83.110 HEREDITARY HEMOCHROMATOSIS (HCC): ICD-10-CM

## 2023-04-20 DIAGNOSIS — D69.6 THROMBOCYTOPENIA (HCC): ICD-10-CM

## 2023-04-20 DIAGNOSIS — D72.829 LEUKOCYTOSIS, UNSPECIFIED TYPE: ICD-10-CM

## 2023-04-20 DIAGNOSIS — E83.110 HEREDITARY HEMOCHROMATOSIS (HCC): Primary | ICD-10-CM

## 2023-04-20 LAB
ALBUMIN SERPL-MCNC: 4.2 G/DL (ref 3.5–5.2)
ALP SERPL-CCNC: 85 U/L (ref 35–104)
ALT SERPL-CCNC: 58 U/L (ref 9–52)
ANION GAP SERPL CALCULATED.3IONS-SCNC: 11 MMOL/L (ref 7–19)
AST SERPL-CCNC: 55 U/L (ref 14–36)
BASOPHILS # BLD: 0.03 K/UL (ref 0.01–0.08)
BASOPHILS NFR BLD: 0.5 % (ref 0.1–1.2)
BILIRUB SERPL-MCNC: 0.6 MG/DL (ref 0.2–1.3)
BUN SERPL-MCNC: 12 MG/DL (ref 7–17)
CALCIUM SERPL-MCNC: 9.3 MG/DL (ref 8.4–10.2)
CHLORIDE SERPL-SCNC: 100 MMOL/L (ref 98–111)
CO2 SERPL-SCNC: 26 MMOL/L (ref 22–29)
CREAT SERPL-MCNC: 0.7 MG/DL (ref 0.5–1)
EOSINOPHIL # BLD: 0.25 K/UL (ref 0.04–0.54)
EOSINOPHIL NFR BLD: 4.1 % (ref 0.7–7)
ERYTHROCYTE [DISTWIDTH] IN BLOOD BY AUTOMATED COUNT: 16.5 % (ref 11.7–14.4)
FERRITIN SERPL-MCNC: 26.4 NG/ML (ref 13–150)
GLUCOSE SERPL-MCNC: 106 MG/DL (ref 74–106)
HCT VFR BLD AUTO: 41.7 % (ref 34.1–44.9)
HGB BLD-MCNC: 12.5 G/DL (ref 11.2–15.7)
IRON SATN MFR SERPL: 26 % (ref 14–50)
IRON SERPL-MCNC: 86 UG/DL (ref 37–145)
LYMPHOCYTES # BLD: 1.87 K/UL (ref 1.18–3.74)
LYMPHOCYTES NFR BLD: 30.6 % (ref 19.3–53.1)
MCH RBC QN AUTO: 26.2 PG (ref 25.6–32.2)
MCHC RBC AUTO-ENTMCNC: 30 G/DL (ref 32.3–35.5)
MCV RBC AUTO: 87.2 FL (ref 79.4–94.8)
MONOCYTES # BLD: 0.57 K/UL (ref 0.24–0.82)
MONOCYTES NFR BLD: 9.3 % (ref 4.7–12.5)
NEUTROPHILS # BLD: 3.38 K/UL (ref 1.56–6.13)
NEUTS SEG NFR BLD: 55.2 % (ref 34–71.1)
PLATELET # BLD AUTO: 228 K/UL (ref 182–369)
PMV BLD AUTO: 11 FL (ref 7.4–10.4)
POTASSIUM SERPL-SCNC: 4.7 MMOL/L (ref 3.5–5.1)
PROT SERPL-MCNC: 7.1 G/DL (ref 6.3–8.2)
RBC # BLD AUTO: 4.78 M/UL (ref 3.93–5.22)
SODIUM SERPL-SCNC: 137 MMOL/L (ref 137–145)
TIBC SERPL-MCNC: 332 UG/DL (ref 250–400)
WBC # BLD AUTO: 6.12 K/UL (ref 3.98–10.04)

## 2023-04-20 PROCEDURE — 99212 OFFICE O/P EST SF 10 MIN: CPT

## 2023-04-20 PROCEDURE — 36415 COLL VENOUS BLD VENIPUNCTURE: CPT

## 2023-04-20 PROCEDURE — 80053 COMPREHEN METABOLIC PANEL: CPT

## 2023-04-20 PROCEDURE — 99195 PHLEBOTOMY: CPT

## 2023-04-20 PROCEDURE — 85025 COMPLETE CBC W/AUTO DIFF WBC: CPT

## 2023-04-20 PROCEDURE — 99213 OFFICE O/P EST LOW 20 MIN: CPT | Performed by: PHYSICIAN ASSISTANT

## 2023-04-20 RX ORDER — 0.9 % SODIUM CHLORIDE 0.9 %
500 INTRAVENOUS SOLUTION INTRAVENOUS ONCE
OUTPATIENT
Start: 2023-04-20 | End: 2023-04-20

## 2023-04-20 RX ORDER — 0.9 % SODIUM CHLORIDE 0.9 %
250 INTRAVENOUS SOLUTION INTRAVENOUS ONCE
OUTPATIENT
Start: 2023-04-20 | End: 2023-04-20

## 2023-04-20 RX ORDER — PANTOPRAZOLE SODIUM 40 MG/1
40 TABLET, DELAYED RELEASE ORAL 2 TIMES DAILY
COMMUNITY

## 2023-04-20 ASSESSMENT — ENCOUNTER SYMPTOMS
ABDOMINAL DISTENTION: 0
COLOR CHANGE: 0
BLOOD IN STOOL: 0
SORE THROAT: 0
CONSTIPATION: 0
EYE DISCHARGE: 0
COUGH: 0
TROUBLE SWALLOWING: 0
WHEEZING: 0
VOICE CHANGE: 0
PHOTOPHOBIA: 0
VOMITING: 0
ABDOMINAL PAIN: 0
NAUSEA: 0
EYE ITCHING: 0
DIARRHEA: 0
SHORTNESS OF BREATH: 0
BACK PAIN: 0

## 2023-04-20 NOTE — PROGRESS NOTES
THERAPEUTIC PHLEBOTOMY    Most recent Hemoglobin 12.6Gm/dl and Hematocrit 41.7%    Date obtained: 04 /20 /2023      Pre-phlebotomy Vital Signs: Refer to Doc flow sheet    Start time: 940    Tourniquet placed on patient's arm and arm palpated for venous access. Area of venous access cleansed with alcohol. Sheath removed from the needle and needle inserted into the left antecubital site. Blood flowing well down the tubing into collection bag. Adjustment/no adjustment of needle needed to maintain adequate blood flow. Scale monitoring amount of blood in bag. Stop Time: 1005  Needle removed from patient's arm. Pressure applied to patient's arm until bleeding stopped. Dry sterile dressing applied over puncture site and secured with Coban/self-adherent wrap. Final amount of blood removed: 300 ml  Post Vital Signs: Refer to Doc flow sheet      Patient tolerated procedure well. No redness, edema, or signs of active bleeding noted. Discharge Instructions provided: No heavy pushing or lifting for the next 24 hours. Keep dressing dry and in place for 4 to 6 hours. If a fever GREATER than 100.5 develops, contact office. Patient discharged ambulatory with belongings.

## 2023-08-30 ENCOUNTER — PREP FOR SURGERY (OUTPATIENT)
Dept: OTHER | Facility: HOSPITAL | Age: 43
End: 2023-08-30
Payer: COMMERCIAL

## 2023-08-30 DIAGNOSIS — K20.90 ESOPHAGITIS: Primary | ICD-10-CM

## 2023-08-30 RX ORDER — DEXTROSE AND SODIUM CHLORIDE 5; .45 G/100ML; G/100ML
30 INJECTION, SOLUTION INTRAVENOUS CONTINUOUS PRN
OUTPATIENT
Start: 2023-08-30

## 2023-08-31 PROBLEM — K20.90 ESOPHAGITIS: Status: ACTIVE | Noted: 2023-08-31

## 2023-09-11 RX ORDER — RABEPRAZOLE SODIUM 20 MG/1
20 TABLET, DELAYED RELEASE ORAL 2 TIMES DAILY
COMMUNITY

## 2023-09-14 ENCOUNTER — HOSPITAL ENCOUNTER (OUTPATIENT)
Facility: HOSPITAL | Age: 43
Setting detail: HOSPITAL OUTPATIENT SURGERY
Discharge: HOME | End: 2023-09-14
Attending: INTERNAL MEDICINE | Admitting: INTERNAL MEDICINE
Payer: COMMERCIAL

## 2023-09-14 ENCOUNTER — ANESTHESIA (OUTPATIENT)
Dept: GASTROENTEROLOGY | Facility: HOSPITAL | Age: 43
End: 2023-09-14
Payer: COMMERCIAL

## 2023-09-14 ENCOUNTER — ANESTHESIA EVENT (OUTPATIENT)
Dept: GASTROENTEROLOGY | Facility: HOSPITAL | Age: 43
End: 2023-09-14
Payer: COMMERCIAL

## 2023-09-14 VITALS
SYSTOLIC BLOOD PRESSURE: 107 MMHG | RESPIRATION RATE: 18 BRPM | HEIGHT: 64 IN | OXYGEN SATURATION: 95 % | WEIGHT: 212.4 LBS | BODY MASS INDEX: 36.26 KG/M2 | HEART RATE: 94 BPM | TEMPERATURE: 97.7 F | DIASTOLIC BLOOD PRESSURE: 61 MMHG

## 2023-09-14 DIAGNOSIS — K20.90 ESOPHAGITIS: ICD-10-CM

## 2023-09-14 LAB — GLUCOSE BLDC GLUCOMTR-MCNC: 116 MG/DL (ref 70–130)

## 2023-09-14 PROCEDURE — 25010000002 PROPOFOL 10 MG/ML EMULSION

## 2023-09-14 PROCEDURE — 82948 REAGENT STRIP/BLOOD GLUCOSE: CPT

## 2023-09-14 RX ORDER — PROPOFOL 10 MG/ML
VIAL (ML) INTRAVENOUS AS NEEDED
Status: DISCONTINUED | OUTPATIENT
Start: 2023-09-14 | End: 2023-09-14 | Stop reason: SURG

## 2023-09-14 RX ORDER — DEXTROSE AND SODIUM CHLORIDE 5; .45 G/100ML; G/100ML
30 INJECTION, SOLUTION INTRAVENOUS CONTINUOUS PRN
Status: DISCONTINUED | OUTPATIENT
Start: 2023-09-14 | End: 2023-09-14 | Stop reason: HOSPADM

## 2023-09-14 RX ORDER — LIDOCAINE HYDROCHLORIDE 20 MG/ML
INJECTION, SOLUTION EPIDURAL; INFILTRATION; INTRACAUDAL; PERINEURAL AS NEEDED
Status: DISCONTINUED | OUTPATIENT
Start: 2023-09-14 | End: 2023-09-14 | Stop reason: SURG

## 2023-09-14 RX ADMIN — PROPOFOL 80 MG: 10 INJECTION, EMULSION INTRAVENOUS at 10:01

## 2023-09-14 RX ADMIN — LIDOCAINE HYDROCHLORIDE 100 MG: 20 INJECTION, SOLUTION EPIDURAL; INFILTRATION; INTRACAUDAL; PERINEURAL at 10:01

## 2023-09-14 RX ADMIN — DEXTROSE AND SODIUM CHLORIDE 30 ML/HR: 5; 450 INJECTION, SOLUTION INTRAVENOUS at 09:20

## 2023-09-14 RX ADMIN — PROPOFOL 100 MG: 10 INJECTION, EMULSION INTRAVENOUS at 10:02

## 2023-09-14 NOTE — H&P
Jazzy Dyer DO,[facility]  Gastroenterology  Hepatology  Endoscopy  Board Certified in Internal Medicine and gastroenterology  [address], [address]  [place]. [address]  T- [phone]   F [phone]     GASTROENTEROLOGY HISTORY AND PHYSICAL  NOTE   JAZZY DYER DO.         SUBJECTIVE:   2023    Name: Neelima Gonzalez  DOD: 1980      Chief Complaint:         Subjective: Acid reflux.  Recurrent despite medical treatments    Patient is 42 y.o. female presents with desire for elective EGD with biopsy.      ROS/HISTORY/ CURRENT MEDICATIONS/OBJECTIVE/VS/PE:   Review of Systems:  All systems unremarkable unless specified below.  Constitutional   HENT  Eyes   Respiratory    Cardiovascular  Gastrointestinal   Endocrine  Genitourinary    Musculoskeletal   Skin  Allergic/Immunologic    Neurological    Hematological  Psychiatric/Behavioral    History:     Past Medical History:   Diagnosis Date    Chickenpox     Diabetes mellitus     Fatty liver     GERD (gastroesophageal reflux disease)     Hemochromatosis     Hiatal hernia     HPV in female     Hypertension     Kidney stones     Spasm of GI tract     Tachycardia      Past Surgical History:   Procedure Laterality Date     SECTION      CHOLECYSTECTOMY      ENDOSCOPY N/A 2022    Procedure: ESOPHAGOGASTRODUODENOSCOPY 11:30;  Surgeon: Jazzy Dyer DO;  Location: [facility] ENDOSCOPY;  Service: Gastroenterology;  Laterality: N/A;    EXTRACORPOREAL SHOCK WAVE LITHOTRIPSY (ESWL)      KNEE ARTHROSCOPY Right     URETER SURGERY       History reviewed. No pertinent family history.  Social History     Tobacco Use    Smoking status: Never    Smokeless tobacco: Never   Vaping Use    Vaping Use: Never used   Substance Use Topics    Alcohol use: No    Drug use: No     Prior to Admission medications    Medication Sig Start Date End Date Taking? Authorizing Provider   metFORMIN (GLUCOPHAGE) 500 MG tablet Take 1 tablet by mouth 2  (Two) Times a Day With Meals.   Yes Provider, MD Radha   metoprolol succinate XL (TOPROL-XL) 25 MG 24 hr tablet Take 1 tablet by mouth Daily.   Yes Provider, MD Radha   RABEprazole (ACIPHEX) 20 MG EC tablet Take 1 tablet by mouth 2 (Two) Times a Day.   Yes Provider, MD Radha     Allergies:  Sulfa antibiotics and Zithromax [azithromycin]    I have reviewed the patients medical history, surgical history and family history in the available medical record system.     Current Medications:     Current Facility-Administered Medications   Medication Dose Route Frequency Provider Last Rate Last Admin    dextrose 5 % and sodium chloride 0.45 % infusion  30 mL/hr Intravenous Continuous PRN Hayden Strauss DO 30 mL/hr at 09/14/23 0920 30 mL/hr at 09/14/23 0920       Objective     Physical Exam:   Temp:  [97.5 °F (36.4 °C)] 97.5 °F (36.4 °C)  Heart Rate:  [88] 88  Resp:  [16] 16  BP: (151)/(89) 151/89    Physical Exam:  General Appearance:    Alert, cooperative, in no acute distress   Head:    Normocephalic, without obvious abnormality, atraumatic   Eyes:            Lids and lashes normal, conjunctivae and sclerae normal, no icterus, no pallor, corneas clear, PERRLA   Ears:    Ears appear intact with no abnormalities noted   Throat:   No oral lesions, no thrush, oral mucosa moist   Neck:   No adenopathy, supple, trachea midline, no thyromegaly, no  carotid bruit, no JVD   Back:     No kyphosis present, no scoliosis present, no skin lesions,   erythema or scars, no tenderness to percussion or                 palpation,  range of motion normal   Lungs:     Clear to auscultation,respirations regular, even and         unlabored    Heart:    Regular rhythm and normal rate, normal S1 and S2, no  murmur, no gallop, no rub, no click   Breast Exam:    Deferred   Abdomen:     Normal bowel sounds, no masses, no organomegaly, soft  nontender, nondistended, no guarding, no rebound                 tenderness   Genitalia:     Deferred   Extremities:   Moves all extremities well, no edema, no cyanosis, no          redness   Pulses:   Pulses palpable and equal bilaterally   Skin:   No bleeding, bruising or rash   Lymph nodes:   No palpable adenopathy   Neurologic:   Cranial nerves 2 - 12 grossly intact, sensation intact, DTR     present and equal bilaterally      Results Review:     Lab Results   Component Value Date    WBC 6.12 04/20/2023    WBC 7.19 10/20/2022    WBC 11.87 (H) 06/16/2022    HGB 12.5 04/20/2023    HGB 14.9 10/20/2022    HGB 14.4 06/16/2022    HCT 41.7 04/20/2023    HCT 48.2 (H) 10/20/2022    HCT 44.6 06/16/2022     04/20/2023     10/20/2022     06/16/2022             No results found for: LIPASE  No results found for: INR  No results found for: CULTURE    Radiology Review:  Imaging Results (Last 72 Hours)       ** No results found for the last 72 hours. **             I reviewed the patient's new clinical results.  I reviewed the patient's new imaging results and agree with the interpretation.     ASSESSMENT/PLAN:   ASSESSMENT:  1.  Acid reflux, despite the medical treatments    PLAN:  1.  Esophagogastroduodenoscopy with biopsy    Risk and benefits associated with the procedure are reviewed with the patient.  The patient wished to proceed     Hayden Strauss DO  09/14/23  09:45 CDT

## 2023-09-14 NOTE — ANESTHESIA PREPROCEDURE EVALUATION
Anesthesia Evaluation     Patient summary reviewed and Nursing notes reviewed   NPO Solid Status: > 8 hours  NPO Liquid Status: > 8 hours           Airway   Mallampati: II  TM distance: >3 FB  Neck ROM: full  No difficulty expected  Dental - normal exam     Pulmonary - normal exam   Cardiovascular - normal exam    Beta blocker given within 24 hours of surgery    (+) dysrhythmias Tachycardia      Neuro/Psych- negative ROS  GI/Hepatic/Renal/Endo    (+) obesity, hiatal hernia, GERD, liver disease fatty liver disease, renal disease stones, diabetes mellitus type 2 well controlled    ROS Comment: Esophageal stricture    Musculoskeletal     Abdominal   (+) obese   Substance History      OB/GYN          Other          Other Comment: hemochromocytoma                  Anesthesia Plan    ASA 3     general   total IV anesthesia    Anesthetic plan, risks, benefits, and alternatives have been provided, discussed and informed consent has been obtained with: patient.      CODE STATUS:

## 2023-09-14 NOTE — DISCHARGE INSTR - APPOINTMENTS
Dr. Hayden Strauss, DO  [address], [address]  [place] [address]  [phone]    Appointment date and time: December 19, 2023 at 3:15 p.m.

## 2023-09-14 NOTE — ANESTHESIA POSTPROCEDURE EVALUATION
Patient: Neelima Gonzalez    Procedure Summary       Date: 09/14/23 Room / Location: [facility] ENDOSCOPY 2 / [facility] ENDOSCOPY    Anesthesia Start: 0954 Anesthesia Stop: 1009    Procedure: ESOPHAGOGASTRODUODENOSCOPY 10:00 Diagnosis:       Esophagitis      (Esophagitis [K20.90])    Surgeons: Hayden Strauss DO Provider: Deysi Ley CRNA    Anesthesia Type: general ASA Status: 3            Anesthesia Type: general    Vitals  No vitals data found for the desired time range.          Post Anesthesia Care and Evaluation    Patient location during evaluation: PHASE II  Patient participation: complete - patient cannot participate  Level of consciousness: sleepy but conscious  Pain score: 0  Pain management: adequate    Airway patency: patent  Anesthetic complications: No anesthetic complications  PONV Status: none  Cardiovascular status: acceptable  Respiratory status: acceptable  Hydration status: acceptable  No anesthesia care post op

## 2023-09-15 LAB — REF LAB TEST METHOD: NORMAL

## 2023-10-19 ENCOUNTER — TELEPHONE (OUTPATIENT)
Dept: HEMATOLOGY | Age: 43
End: 2023-10-19

## 2023-10-19 DIAGNOSIS — D69.6 THROMBOCYTOPENIA (HCC): ICD-10-CM

## 2023-10-19 DIAGNOSIS — E83.110 HEREDITARY HEMOCHROMATOSIS (HCC): Primary | ICD-10-CM

## 2023-10-19 NOTE — PROGRESS NOTES
is up to 44%.   LFTs only reveal a minimal elevation of ALT at 37 (0-32)    Hemochromatosis panel 2021 -heterozygous H63D mutation    Abdominal ultrasound [facility] 2021 revealed  Mild diffuse fatty infiltration of the liver  \"Normal spleen\"    AFP 1.7 on 2021      TREATMENT SUMMARY  Therapeutic phlebotomy 250 cc 2021 -subsequently continued        Past Medical History:   Diagnosis Date    Anxiety     Diabetes mellitus ([address] )     Essential hypertension     GERD with stricture     Heart murmur     Hereditary hemochromatosis ([address] )     Kidney stones     Multiple gastric ulcers         Past Surgical History:   Procedure Laterality Date    BLADDER SURGERY       SECTION  2013    CHOLECYSTECTOMY      HYSTEROSCOPY      [facility]    LITHOTRIPSY      UPPER GASTROINTESTINAL ENDOSCOPY             Current Outpatient Medications:     RABEprazole (ACIPHEX) 20 MG tablet, Take 1 tablet by mouth 2 times daily, Disp: , Rfl:     hydrOXYzine HCl (ATARAX) 25 MG tablet, 1 tablet as needed, Disp: , Rfl:     cloNIDine (CATAPRES) 0.1 MG tablet, 1 tablet as needed, Disp: , Rfl:     metFORMIN (GLUCOPHAGE) 500 MG tablet, Take 1 tablet by mouth in the morning and at bedtime, Disp: , Rfl:     metoprolol succinate (TOPROL XL) 25 MG extended release tablet, , Disp: , Rfl:      Allergies   Allergen Reactions    Azithromycin Hives    Sulfa Antibiotics      Other reaction(s): Nausea And Vomiting       Social History     Tobacco Use    Smoking status: Never    Smokeless tobacco: Never   Substance Use Topics    Alcohol use: Not Currently    Drug use: Never       Family History   Problem Relation Age of Onset    High Blood Pressure Mother     Osteoporosis Mother     High Cholesterol Mother     High Blood Pressure Father     Stroke Father     Diabetes Father     High Blood Pressure Brother     Other Maternal Grandmother         MDS    Cancer Maternal Grandmother         Skin Cancer    High Blood Pressure

## 2023-10-19 NOTE — TELEPHONE ENCOUNTER
I called patient to remind them of their appointment on 10/20/2023.  Detailed voicemail was left with appointment date and time

## 2023-10-20 ENCOUNTER — HOSPITAL ENCOUNTER (OUTPATIENT)
Dept: INFUSION THERAPY | Age: 43
Discharge: HOME | End: 2023-10-20
Payer: COMMERCIAL

## 2023-10-20 ENCOUNTER — TELEPHONE (OUTPATIENT)
Dept: HEMATOLOGY | Age: 43
End: 2023-10-20

## 2023-10-20 ENCOUNTER — OFFICE VISIT (OUTPATIENT)
Dept: HEMATOLOGY | Age: 43
End: 2023-10-20
Payer: COMMERCIAL

## 2023-10-20 VITALS
DIASTOLIC BLOOD PRESSURE: 84 MMHG | HEIGHT: 64 IN | HEART RATE: 94 BPM | OXYGEN SATURATION: 100 % | BODY MASS INDEX: 36.19 KG/M2 | SYSTOLIC BLOOD PRESSURE: 128 MMHG | WEIGHT: 212 LBS

## 2023-10-20 DIAGNOSIS — E83.110 HEREDITARY HEMOCHROMATOSIS (HCC): Primary | ICD-10-CM

## 2023-10-20 DIAGNOSIS — D69.6 THROMBOCYTOPENIA (HCC): ICD-10-CM

## 2023-10-20 DIAGNOSIS — E83.110 HEREDITARY HEMOCHROMATOSIS (HCC): ICD-10-CM

## 2023-10-20 DIAGNOSIS — R79.89 ELEVATED LFTS: ICD-10-CM

## 2023-10-20 LAB
ALBUMIN SERPL-MCNC: 4.4 G/DL (ref 3.5–5.2)
ALP SERPL-CCNC: 90 U/L (ref 35–104)
ALT SERPL-CCNC: 100 U/L (ref 9–52)
ANION GAP SERPL CALCULATED.3IONS-SCNC: 10 MMOL/L (ref 7–19)
AST SERPL-CCNC: 110 U/L (ref 14–36)
BASOPHILS # BLD: 0.05 K/UL (ref 0.01–0.08)
BASOPHILS NFR BLD: 0.7 % (ref 0.1–1.2)
BILIRUB SERPL-MCNC: 0.6 MG/DL (ref 0.2–1.3)
BUN SERPL-MCNC: 10 MG/DL (ref 7–17)
CALCIUM SERPL-MCNC: 9.4 MG/DL (ref 8.4–10.2)
CHLORIDE SERPL-SCNC: 104 MMOL/L (ref 98–111)
CO2 SERPL-SCNC: 24 MMOL/L (ref 22–29)
CREAT SERPL-MCNC: 0.6 MG/DL (ref 0.5–1)
EOSINOPHIL # BLD: 0.33 K/UL (ref 0.04–0.54)
EOSINOPHIL NFR BLD: 4.3 % (ref 0.7–7)
ERYTHROCYTE [DISTWIDTH] IN BLOOD BY AUTOMATED COUNT: 15.3 % (ref 11.7–14.4)
GLOBULIN: 3.2 G/DL
GLUCOSE SERPL-MCNC: 150 MG/DL (ref 74–106)
HCT VFR BLD AUTO: 39.9 % (ref 34.1–44.9)
HGB BLD-MCNC: 13 G/DL (ref 11.2–15.7)
LYMPHOCYTES # BLD: 2.29 K/UL (ref 1.18–3.74)
LYMPHOCYTES NFR BLD: 30 % (ref 19.3–53.1)
MCH RBC QN AUTO: 26 PG (ref 25.6–32.2)
MCHC RBC AUTO-ENTMCNC: 32.6 G/DL (ref 32.3–35.5)
MCV RBC AUTO: 79.8 FL (ref 79.4–94.8)
MONOCYTES # BLD: 0.57 K/UL (ref 0.24–0.82)
MONOCYTES NFR BLD: 7.5 % (ref 4.7–12.5)
NEUTROPHILS # BLD: 4.38 K/UL (ref 1.56–6.13)
NEUTS SEG NFR BLD: 57.2 % (ref 34–71.1)
PLATELET # BLD AUTO: 144 K/UL (ref 182–369)
PMV BLD AUTO: 11.7 FL (ref 7.4–10.4)
POTASSIUM SERPL-SCNC: 3.9 MMOL/L (ref 3.5–5.1)
PROT SERPL-MCNC: 7.5 G/DL (ref 6.3–8.2)
RBC # BLD AUTO: 5 M/UL (ref 3.93–5.22)
SODIUM SERPL-SCNC: 138 MMOL/L (ref 137–145)
WBC # BLD AUTO: 7.64 K/UL (ref 3.98–10.04)

## 2023-10-20 PROCEDURE — 99212 OFFICE O/P EST SF 10 MIN: CPT

## 2023-10-20 PROCEDURE — 80053 COMPREHEN METABOLIC PANEL: CPT

## 2023-10-20 PROCEDURE — 36415 COLL VENOUS BLD VENIPUNCTURE: CPT

## 2023-10-20 PROCEDURE — 85025 COMPLETE CBC W/AUTO DIFF WBC: CPT

## 2023-10-20 PROCEDURE — 99214 OFFICE O/P EST MOD 30 MIN: CPT | Performed by: PHYSICIAN ASSISTANT

## 2023-10-20 RX ORDER — CLONIDINE HYDROCHLORIDE 0.1 MG/1
0.1 TABLET ORAL PRN
COMMUNITY
Start: 2023-10-09

## 2023-10-20 RX ORDER — RABEPRAZOLE SODIUM 20 MG/1
20 TABLET, DELAYED RELEASE ORAL 2 TIMES DAILY
COMMUNITY

## 2023-10-20 RX ORDER — HYDROXYZINE HYDROCHLORIDE 25 MG/1
25 TABLET, FILM COATED ORAL PRN
COMMUNITY
Start: 2023-10-06

## 2023-10-20 ASSESSMENT — ENCOUNTER SYMPTOMS
DIARRHEA: 0
BLOOD IN STOOL: 0
SHORTNESS OF BREATH: 1
COUGH: 0
BACK PAIN: 0
CONSTIPATION: 0
SORE THROAT: 0
NAUSEA: 0
ABDOMINAL DISTENTION: 0
PHOTOPHOBIA: 0
COLOR CHANGE: 0
EYE DISCHARGE: 0
VOMITING: 0
VOICE CHANGE: 0
WHEEZING: 0
ABDOMINAL PAIN: 0
EYE ITCHING: 0
TROUBLE SWALLOWING: 0

## 2023-10-20 NOTE — TELEPHONE ENCOUNTER
----- Message from Melisa Juarez PA-C sent at 10/20/2023  1:36 PM CDT -----  Arrange ultrasound of the liver at [facility] hereditary hemochromatosis and elevated liver functions

## 2023-10-24 ENCOUNTER — TELEPHONE (OUTPATIENT)
Dept: HEMATOLOGY | Age: 43
End: 2023-10-24

## 2023-10-24 NOTE — TELEPHONE ENCOUNTER
Confirmed appointment at [address]. October 27 at 8:45. Nothing to eat or drink after midnight.

## 2024-04-17 ENCOUNTER — TELEPHONE (OUTPATIENT)
Dept: HEMATOLOGY | Age: 44
End: 2024-04-17

## 2024-04-19 NOTE — PROGRESS NOTES
hepatomegaly or splenomegaly.      Tenderness: There is no abdominal tenderness.   Musculoskeletal:         General: No tenderness or deformity.      Cervical back: Normal range of motion and neck supple.   Skin:     General: Skin is warm and dry.      Findings: No rash.   Neurological:      Mental Status: She is alert and oriented to person, place, and time.      Coordination: Coordination normal.   Psychiatric:         Behavior: Behavior normal.         Thought Content: Thought content normal.       CBC reviewed by me  Lab Results   Component Value Date    WBC 6.73 04/22/2024    HGB 12.7 04/22/2024    HCT 40.2 04/22/2024    MCV 80.9 04/22/2024     04/22/2024     Lab Results   Component Value Date    NEUTROABS 3.61 04/22/2024       VISIT DIAGNOSES  1. Hereditary hemochromatosis (HCC)    2. Thrombocytopenia (HCC)        ASSESSMENT/PLAN:    1. Heterozygous H63D Hereditary hemochromatosis -stable      Abdominal ultrasound [facility] 6/8/2021 revealed  Mild diffuse fatty infiltration of the liver  \"Normal spleen\"    Serology 10/20/2022  Serum FE-74  TIBC-274  FE sat-27%  Ferritin-65.6    Serology 4/20/2023  Serum FE-86  TIBC-332  FE sat-26  Ferritin-26.4    CMP-ALT- 58, AST-55     Serology 10/20/2023  Serum FE-63  TIBC-365  FE sat-17%  Ferritin-34.0    CMP-,     2 D echo at [facility] on 10/11/2023  Left ventricle is normal size  Normal global wall motion  Visual EF is 55-60%  Normal diastolic filling pattern, normal LAP  Nor evidence of pulmonary hypertension.       US Liver at [facility] on 10/27/2023. No comparison  Remote cholecystectomy  Mild hepatic steatosis            Ferritin level is only 34 with iron saturation only 17%.  She has mildly chronic elevated AST ALT.  Ultrasound of the liver is up-to-date.  AFP is normal at 1.7.    BC today reveals WBC of 6.73 with normal differential, Hgb 12.7, ,000.  She does not require therapeutic phlebotomy today and will continue to be monitored.        PLAN  CMP,

## 2024-04-22 ENCOUNTER — OFFICE VISIT (OUTPATIENT)
Dept: HEMATOLOGY | Age: 44
End: 2024-04-22
Payer: COMMERCIAL

## 2024-04-22 ENCOUNTER — HOSPITAL ENCOUNTER (OUTPATIENT)
Dept: INFUSION THERAPY | Age: 44
Discharge: HOME | End: 2024-04-22
Payer: COMMERCIAL

## 2024-04-22 VITALS
TEMPERATURE: 98.1 F | HEIGHT: 64 IN | HEART RATE: 96 BPM | WEIGHT: 205.6 LBS | BODY MASS INDEX: 35.1 KG/M2 | DIASTOLIC BLOOD PRESSURE: 74 MMHG | SYSTOLIC BLOOD PRESSURE: 110 MMHG | OXYGEN SATURATION: 97 %

## 2024-04-22 DIAGNOSIS — E83.110 HEREDITARY HEMOCHROMATOSIS (HCC): ICD-10-CM

## 2024-04-22 DIAGNOSIS — D69.6 THROMBOCYTOPENIA (HCC): ICD-10-CM

## 2024-04-22 DIAGNOSIS — E83.110 HEREDITARY HEMOCHROMATOSIS (HCC): Primary | ICD-10-CM

## 2024-04-22 LAB
ALBUMIN SERPL-MCNC: 4.6 G/DL (ref 3.5–5.2)
ALP SERPL-CCNC: 97 U/L (ref 35–104)
ALT SERPL-CCNC: 55 U/L (ref 9–52)
ANION GAP SERPL CALCULATED.3IONS-SCNC: 11 MMOL/L (ref 7–19)
AST SERPL-CCNC: 40 U/L (ref 14–36)
BASOPHILS # BLD: 0.04 K/UL (ref 0.01–0.08)
BASOPHILS NFR BLD: 0.6 % (ref 0.1–1.2)
BILIRUB SERPL-MCNC: 0.7 MG/DL (ref 0.2–1.3)
BUN SERPL-MCNC: 12 MG/DL (ref 7–17)
CALCIUM SERPL-MCNC: 9.2 MG/DL (ref 8.4–10.2)
CHLORIDE SERPL-SCNC: 103 MMOL/L (ref 98–111)
CO2 SERPL-SCNC: 26 MMOL/L (ref 22–29)
CREAT SERPL-MCNC: 0.5 MG/DL (ref 0.5–1)
EOSINOPHIL # BLD: 0.24 K/UL (ref 0.04–0.54)
EOSINOPHIL NFR BLD: 3.6 % (ref 0.7–7)
ERYTHROCYTE [DISTWIDTH] IN BLOOD BY AUTOMATED COUNT: 15.9 % (ref 11.7–14.4)
FERRITIN SERPL-MCNC: 19.3 NG/ML (ref 13–150)
GLOBULIN: 3.4 G/DL
GLUCOSE SERPL-MCNC: 121 MG/DL (ref 74–106)
HCT VFR BLD AUTO: 40.2 % (ref 34.1–44.9)
HGB BLD-MCNC: 12.7 G/DL (ref 11.2–15.7)
IRON SATN MFR SERPL: 12 % (ref 14–50)
IRON SERPL-MCNC: 44 UG/DL (ref 37–145)
LYMPHOCYTES # BLD: 2.3 K/UL (ref 1.18–3.74)
LYMPHOCYTES NFR BLD: 34.2 % (ref 19.3–53.1)
MCH RBC QN AUTO: 25.6 PG (ref 25.6–32.2)
MCHC RBC AUTO-ENTMCNC: 31.6 G/DL (ref 32.3–35.5)
MCV RBC AUTO: 80.9 FL (ref 79.4–94.8)
MONOCYTES # BLD: 0.52 K/UL (ref 0.24–0.82)
MONOCYTES NFR BLD: 7.7 % (ref 4.7–12.5)
NEUTROPHILS # BLD: 3.61 K/UL (ref 1.56–6.13)
NEUTS SEG NFR BLD: 53.6 % (ref 34–71.1)
PLATELET # BLD AUTO: 294 K/UL (ref 182–369)
PMV BLD AUTO: 10.4 FL (ref 7.4–10.4)
POTASSIUM SERPL-SCNC: 3.7 MMOL/L (ref 3.5–5.1)
PROT SERPL-MCNC: 8 G/DL (ref 6.3–8.2)
RBC # BLD AUTO: 4.97 M/UL (ref 3.93–5.22)
SODIUM SERPL-SCNC: 140 MMOL/L (ref 137–145)
TIBC SERPL-MCNC: 370 UG/DL (ref 250–400)
WBC # BLD AUTO: 6.73 K/UL (ref 3.98–10.04)

## 2024-04-22 PROCEDURE — 36415 COLL VENOUS BLD VENIPUNCTURE: CPT

## 2024-04-22 PROCEDURE — 85025 COMPLETE CBC W/AUTO DIFF WBC: CPT

## 2024-04-22 PROCEDURE — 99213 OFFICE O/P EST LOW 20 MIN: CPT | Performed by: PHYSICIAN ASSISTANT

## 2024-04-22 PROCEDURE — 99212 OFFICE O/P EST SF 10 MIN: CPT

## 2024-04-22 PROCEDURE — 80053 COMPREHEN METABOLIC PANEL: CPT

## 2024-04-22 ASSESSMENT — ENCOUNTER SYMPTOMS
COUGH: 0
WHEEZING: 0
NAUSEA: 0
DIARRHEA: 0
BLOOD IN STOOL: 0
ABDOMINAL DISTENTION: 0
SHORTNESS OF BREATH: 0
VOMITING: 0
CONSTIPATION: 0
EYE DISCHARGE: 0
COLOR CHANGE: 0
BACK PAIN: 0
VOICE CHANGE: 0
TROUBLE SWALLOWING: 0
SORE THROAT: 0
ABDOMINAL PAIN: 0
PHOTOPHOBIA: 0
EYE ITCHING: 0

## 2024-11-05 ENCOUNTER — TELEPHONE (OUTPATIENT)
Dept: HEMATOLOGY | Age: 44
End: 2024-11-05

## 2024-11-05 DIAGNOSIS — E83.110 HEREDITARY HEMOCHROMATOSIS (HCC): Primary | ICD-10-CM

## 2024-11-05 DIAGNOSIS — D69.6 THROMBOCYTOPENIA (HCC): ICD-10-CM

## 2024-11-05 NOTE — TELEPHONE ENCOUNTER
I called patient and reminded patient of their appt on 11/06/24 and patient confirmed they would be here. I also let patient know that we have moved into our new cancer facility and asked patient if they were aware of where we were now located, and patient voiced understanding of our new location. Patient knows not to arrive early and that we will get labs at the time of the follow up appointment and not the lab appointment time.

## 2024-11-05 NOTE — PROGRESS NOTES
blood.  She does menstruate however.  I believe her iron deficiency is related to her alto phlebotomy from menstruation.  However she is 44 years old now and I encouraged her to get her screening colonoscopy.  She is followed in [place] by Dr. Emigdio Abdi who does see her for her GERD issues.      PLAN  CMP, iron panel and ferritin  Weight loss for fatty liver  Get screening colonoscopy        HEALTH MAINTENANCE    Cervical cancer screening. Pap annually per Dr. King.  1/2024 reported negative    Breast cancer screening.  Bilateral screening mammogram at [facility] 5/31/2024 was BI-RADS 1    Colon cancer screening - I told her she needs to get her screening colonoscopy performed    Labs this visit  CBC with differential  CMP  Iron panel  Ferritin      Return in about 6 months (around 5/7/2025) for With Rylan.  CBC, follow-up iron panel ferritin    Rylan Dooley PA-C  11:28 AM  11/7/2024

## 2024-11-07 ENCOUNTER — HOSPITAL ENCOUNTER (OUTPATIENT)
Dept: INFUSION THERAPY | Age: 44
Discharge: HOME | End: 2024-11-07
Payer: COMMERCIAL

## 2024-11-07 ENCOUNTER — OFFICE VISIT (OUTPATIENT)
Dept: HEMATOLOGY | Age: 44
End: 2024-11-07
Payer: COMMERCIAL

## 2024-11-07 VITALS
WEIGHT: 202.2 LBS | TEMPERATURE: 98 F | OXYGEN SATURATION: 97 % | SYSTOLIC BLOOD PRESSURE: 130 MMHG | HEIGHT: 64 IN | HEART RATE: 80 BPM | DIASTOLIC BLOOD PRESSURE: 80 MMHG | BODY MASS INDEX: 34.52 KG/M2

## 2024-11-07 DIAGNOSIS — E83.110 HEREDITARY HEMOCHROMATOSIS (HCC): ICD-10-CM

## 2024-11-07 DIAGNOSIS — D69.6 THROMBOCYTOPENIA (HCC): ICD-10-CM

## 2024-11-07 DIAGNOSIS — E83.110 HEREDITARY HEMOCHROMATOSIS (HCC): Primary | ICD-10-CM

## 2024-11-07 LAB
ALBUMIN SERPL-MCNC: 4.1 G/DL (ref 3.5–5.2)
ALP SERPL-CCNC: 82 U/L (ref 35–104)
ALT SERPL-CCNC: 27 U/L (ref 5–33)
ANION GAP SERPL CALCULATED.3IONS-SCNC: 10 MMOL/L (ref 7–19)
AST SERPL-CCNC: 23 U/L (ref 5–32)
BASOPHILS # BLD: 0.06 K/UL (ref 0.01–0.08)
BASOPHILS NFR BLD: 0.6 % (ref 0.1–1.2)
BILIRUB SERPL-MCNC: 0.3 MG/DL (ref 0–1.2)
BUN SERPL-MCNC: 14 MG/DL (ref 6–20)
CALCIUM SERPL-MCNC: 8.9 MG/DL (ref 8.6–10)
CHLORIDE SERPL-SCNC: 103 MMOL/L (ref 98–107)
CO2 SERPL-SCNC: 24 MMOL/L (ref 22–29)
CREAT SERPL-MCNC: 0.6 MG/DL (ref 0.5–0.9)
EOSINOPHIL # BLD: 0.36 K/UL (ref 0.04–0.54)
EOSINOPHIL NFR BLD: 3.9 % (ref 0.7–7)
ERYTHROCYTE [DISTWIDTH] IN BLOOD BY AUTOMATED COUNT: 15.9 % (ref 11.7–14.4)
FERRITIN SERPL-MCNC: 14.6 NG/ML (ref 13–150)
GLUCOSE SERPL-MCNC: 175 MG/DL (ref 70–99)
HCT VFR BLD AUTO: 36 % (ref 34.1–44.9)
HGB BLD-MCNC: 11.1 G/DL (ref 11.2–15.7)
IRON SATN MFR SERPL: 8 % (ref 14–50)
IRON SERPL-MCNC: 29 UG/DL (ref 37–145)
LYMPHOCYTES # BLD: 2.28 K/UL (ref 1.18–3.74)
LYMPHOCYTES NFR BLD: 24.5 % (ref 19.3–53.1)
MCH RBC QN AUTO: 24.7 PG (ref 25.6–32.2)
MCHC RBC AUTO-ENTMCNC: 30.8 G/DL (ref 32.3–35.5)
MCV RBC AUTO: 80.2 FL (ref 79.4–94.8)
MONOCYTES # BLD: 0.67 K/UL (ref 0.24–0.82)
MONOCYTES NFR BLD: 7.2 % (ref 4.7–12.5)
NEUTROPHILS # BLD: 5.89 K/UL (ref 1.56–6.13)
NEUTS SEG NFR BLD: 63.5 % (ref 34–71.1)
PLATELET # BLD AUTO: 351 K/UL (ref 182–369)
PMV BLD AUTO: 9.9 FL (ref 7.4–10.4)
POTASSIUM SERPL-SCNC: 4 MMOL/L (ref 3.5–5.1)
PROT SERPL-MCNC: 6.9 G/DL (ref 6.4–8.3)
RBC # BLD AUTO: 4.49 M/UL (ref 3.93–5.22)
SODIUM SERPL-SCNC: 137 MMOL/L (ref 136–145)
TIBC SERPL-MCNC: 360 UG/DL (ref 250–400)
WBC # BLD AUTO: 9.29 K/UL (ref 3.98–10.04)

## 2024-11-07 PROCEDURE — 85025 COMPLETE CBC W/AUTO DIFF WBC: CPT

## 2024-11-07 PROCEDURE — 99213 OFFICE O/P EST LOW 20 MIN: CPT | Performed by: PHYSICIAN ASSISTANT

## 2024-11-07 PROCEDURE — 99212 OFFICE O/P EST SF 10 MIN: CPT

## 2024-11-07 PROCEDURE — 36415 COLL VENOUS BLD VENIPUNCTURE: CPT

## 2024-11-07 PROCEDURE — 80053 COMPREHEN METABOLIC PANEL: CPT

## 2024-11-07 RX ORDER — CELECOXIB 200 MG/1
200 CAPSULE ORAL DAILY
COMMUNITY
Start: 2024-11-04

## 2024-11-07 ASSESSMENT — ENCOUNTER SYMPTOMS
VOMITING: 0
ABDOMINAL PAIN: 0
BACK PAIN: 0
COUGH: 0
CONSTIPATION: 0
NAUSEA: 0
EYE ITCHING: 0
COLOR CHANGE: 0
PHOTOPHOBIA: 0
TROUBLE SWALLOWING: 0
VOICE CHANGE: 0
WHEEZING: 0
BLOOD IN STOOL: 0
SORE THROAT: 0
EYE DISCHARGE: 0
DIARRHEA: 0
ABDOMINAL DISTENTION: 0
SHORTNESS OF BREATH: 0

## 2025-01-07 ENCOUNTER — OFFICE VISIT (OUTPATIENT)
Dept: OBSTETRICS AND GYNECOLOGY | Age: 45
End: 2025-01-07
Payer: COMMERCIAL

## 2025-01-07 VITALS
WEIGHT: 201 LBS | SYSTOLIC BLOOD PRESSURE: 116 MMHG | HEIGHT: 64 IN | DIASTOLIC BLOOD PRESSURE: 78 MMHG | BODY MASS INDEX: 34.31 KG/M2

## 2025-01-07 DIAGNOSIS — Z01.419 WELL WOMAN EXAM WITH ROUTINE GYNECOLOGICAL EXAM: Primary | ICD-10-CM

## 2025-01-07 DIAGNOSIS — Z12.11 ENCOUNTER FOR SCREENING COLONOSCOPY: ICD-10-CM

## 2025-01-07 DIAGNOSIS — Z12.4 ENCOUNTER FOR SCREENING FOR CERVICAL CANCER: ICD-10-CM

## 2025-01-07 DIAGNOSIS — Z12.31 ENCOUNTER FOR SCREENING MAMMOGRAM FOR BREAST CANCER: ICD-10-CM

## 2025-01-07 PROCEDURE — G0123 SCREEN CERV/VAG THIN LAYER: HCPCS

## 2025-01-07 PROCEDURE — 99495 TRANSJ CARE MGMT MOD F2F 14D: CPT

## 2025-01-07 PROCEDURE — 99386 PREV VISIT NEW AGE 40-64: CPT

## 2025-01-07 PROCEDURE — 87624 HPV HI-RISK TYP POOLED RSLT: CPT

## 2025-01-07 RX ORDER — POLYETHYLENE GLYCOL 3350 17 G/17G
POWDER, FOR SOLUTION ORAL
COMMUNITY
Start: 2024-12-17

## 2025-01-07 RX ORDER — METOPROLOL SUCCINATE 50 MG/1
1 TABLET, EXTENDED RELEASE ORAL DAILY
COMMUNITY
Start: 2024-12-17

## 2025-01-07 RX ORDER — ACYCLOVIR 800 MG/1
TABLET ORAL
COMMUNITY
Start: 2024-09-26

## 2025-01-07 NOTE — PROGRESS NOTES
"Subjective     Neelima Gonzalez is a 44 y.o. female    Gynecologic Exam  The patient's pertinent negatives include no genital itching, genital lesions, genital odor, genital rash, missed menses, pelvic pain, vaginal bleeding or vaginal discharge. The patient is experiencing no pain. Pertinent negatives include no abdominal pain, anorexia, back pain, chills, constipation, diarrhea, discolored urine, dysuria, fever, flank pain, frequency, headaches, hematuria, joint pain, joint swelling, nausea, painful intercourse, rash, sore throat, urgency or vomiting. She is sexually active. No, her partner does not have an STD. She uses condoms for contraception. Her menstrual history has been regular. The maximum temperature recorded prior to her arrival was no fever.         /78   Ht 162.6 cm (64\")   Wt 91.2 kg (201 lb)   LMP 12/22/2024 (Exact Date)   BMI 34.50 kg/m²     Outpatient Encounter Medications as of 1/7/2025   Medication Sig Dispense Refill    Continuous Glucose Sensor (FreeStyle Rod 3 Sensor) misc CHANGE SENSOR EVERY 14 DAYS TO CONTINUOUSLY MONITOR BLOOD GLUCOSE      metFORMIN (GLUCOPHAGE) 500 MG tablet Take 1 tablet by mouth 2 (Two) Times a Day With Meals.      metoprolol succinate XL (TOPROL-XL) 50 MG 24 hr tablet Take 1 tablet by mouth Daily.      polyethylene glycol (MiraLax) 17 GM/SCOOP powder take by oral route as directed  FOR BOWEL PREP FOR COLONSCOPY      RABEprazole (ACIPHEX) 20 MG EC tablet Take 1 tablet by mouth 2 (Two) Times a Day.      [DISCONTINUED] metoprolol succinate XL (TOPROL-XL) 25 MG 24 hr tablet Take 1 tablet by mouth Daily.       No facility-administered encounter medications on file as of 1/7/2025.       Past Medical History  Past Medical History:   Diagnosis Date    Chickenpox     Diabetes mellitus     Fatty liver     GERD (gastroesophageal reflux disease)     Hemochromatosis     Hiatal hernia     HPV in female     Hypertension     Kidney stones     Spasm of GI tract     " Tachycardia        Surgical History  Past Surgical History:   Procedure Laterality Date     SECTION      CHOLECYSTECTOMY      ENDOSCOPY N/A 2022    Procedure: ESOPHAGOGASTRODUODENOSCOPY 11:30;  Surgeon: Hayden Strauss DO;  Location: [facility] ENDOSCOPY;  Service: Gastroenterology;  Laterality: N/A;    ENDOSCOPY N/A 2023    Procedure: ESOPHAGOGASTRODUODENOSCOPY 10:00;  Surgeon: Hayden Strauss DO;  Location: [facility] ENDOSCOPY;  Service: Gastroenterology;  Laterality: N/A;    EXTRACORPOREAL SHOCK WAVE LITHOTRIPSY (ESWL)      KNEE ARTHROSCOPY Right     URETER SURGERY         Family History  Family History   Problem Relation Age of Onset    Leukemia Maternal Grandmother        The following portions of the patient's history were reviewed and updated as appropriate: allergies, current medications, past family history, past medical history, past social history, past surgical history, and problem list.    Review of Systems   Constitutional: Negative.  Negative for chills and fever.   HENT: Negative.  Negative for sore throat.    Eyes: Negative.    Respiratory: Negative.     Cardiovascular: Negative.    Gastrointestinal: Negative.  Negative for abdominal pain, anorexia, constipation, diarrhea, nausea and vomiting.   Endocrine: Negative.    Genitourinary: Negative.  Negative for breast discharge, breast lump, breast pain, dysuria, flank pain, frequency, hematuria, missed menses, pelvic pain, urgency and vaginal discharge.   Musculoskeletal: Negative.  Negative for back pain and joint pain.   Skin: Negative.  Negative for rash.   Allergic/Immunologic: Negative.    Neurological: Negative.    Hematological: Negative.    Psychiatric/Behavioral: Negative.         Objective   Physical Exam  Vitals and nursing note reviewed. Exam conducted with a chaperone present.   Constitutional:       General: She is not in acute distress.     Appearance: She is well-developed. She is obese. She is not diaphoretic.    HENT:      Head: Normocephalic.      Right Ear: External ear normal.      Left Ear: External ear normal.      Nose: Nose normal.   Eyes:      General: No scleral icterus.        Right eye: No discharge.         Left eye: No discharge.      Conjunctiva/sclera: Conjunctivae normal.      Pupils: Pupils are equal, round, and reactive to light.   Neck:      Thyroid: No thyromegaly.      Vascular: No carotid bruit.      Trachea: No tracheal deviation.   Cardiovascular:      Rate and Rhythm: Normal rate and regular rhythm.      Heart sounds: Normal heart sounds. No murmur heard.  Pulmonary:      Effort: Pulmonary effort is normal. No respiratory distress.      Breath sounds: Normal breath sounds. No wheezing.   Chest:   Breasts:     Breasts are symmetrical.      Right: Normal. No swelling, bleeding, inverted nipple, mass, nipple discharge, skin change or tenderness.      Left: Normal. No swelling, bleeding, inverted nipple, mass, nipple discharge, skin change or tenderness.   Abdominal:      General: Bowel sounds are normal. There is no distension.      Palpations: Abdomen is soft. There is no mass.      Tenderness: There is no abdominal tenderness. There is no right CVA tenderness, left CVA tenderness or guarding.      Hernia: No hernia is present. There is no hernia in the left inguinal area or right inguinal area.   Genitourinary:     General: Normal vulva.      Exam position: Lithotomy position.      Labia:         Right: No rash, tenderness, lesion or injury.         Left: No rash, tenderness, lesion or injury.       Vagina: Normal. No signs of injury and foreign body. No vaginal discharge, erythema, tenderness or bleeding.      Cervix: Normal.      Uterus: Normal. Not enlarged, not fixed and not tender.       Adnexa: Right adnexa normal and left adnexa normal.        Right: No mass, tenderness or fullness.          Left: No mass, tenderness or fullness.        Rectum: Normal. No mass.      Comments:   BSU  normal  Urethral meatus  Normal  Perineum  Normal  Musculoskeletal:         General: No tenderness. Normal range of motion.      Cervical back: Normal range of motion and neck supple.   Lymphadenopathy:      Head:      Right side of head: No submental, submandibular, tonsillar, preauricular, posterior auricular or occipital adenopathy.      Left side of head: No submental, submandibular, tonsillar, preauricular, posterior auricular or occipital adenopathy.      Cervical: No cervical adenopathy.      Right cervical: No superficial, deep or posterior cervical adenopathy.     Left cervical: No superficial, deep or posterior cervical adenopathy.      Upper Body:      Right upper body: No supraclavicular, axillary or pectoral adenopathy.      Left upper body: No supraclavicular, axillary or pectoral adenopathy.      Lower Body: No right inguinal adenopathy. No left inguinal adenopathy.   Skin:     General: Skin is warm and dry.      Findings: No bruising, erythema or rash.   Neurological:      Mental Status: She is alert and oriented to person, place, and time.      Coordination: Coordination normal.   Psychiatric:         Mood and Affect: Mood normal.         Behavior: Behavior normal.         Thought Content: Thought content normal.         Judgment: Judgment normal.       PHQ-9 Depression Screening  Little interest or pleasure in doing things? Not at all   Feeling down, depressed, or hopeless? Not at all   PHQ-2 Total Score 0   Trouble falling or staying asleep, or sleeping too much?     Feeling tired or having little energy?     Poor appetite or overeating?     Feeling bad about yourself - or that you are a failure or have let yourself or your family down?     Trouble concentrating on things, such as reading the newspaper or watching television?     Moving or speaking so slowly that other people could have noticed? Or the opposite - being so fidgety or restless that you have been moving around a lot more than usual?      Thoughts that you would be better off dead, or of hurting yourself in some way?     PHQ-9 Total Score     If you checked off any problems, how difficult have these problems made it for you to do your work, take care of things at home, or get along with other people?          Assessment & Plan   Diagnoses and all orders for this visit:    1. Well woman exam with routine gynecological exam (Primary)  Comments:  The patient is new to me and presents to the office today for her WWE. She is established with her pcp and screening labs are current.    2. Encounter for screening for cervical cancer  Comments:  ASCCP guidelines discussed. She desires pap smear today. She is sexually active, declines std and bv testing. Normal pelvic examination.  Orders:  -     Liquid-based Pap Smear, Screening    3. Encounter for screening mammogram for breast cancer  Comments:  Order provided to patient to schedule upcoming screening mammogram at [facility] May 2025.  Orders:  -     Mammo Screening Digital Tomosynthesis Bilateral With CAD; Future    4. Encounter for screening colonoscopy  Comments:  Patient scheduled for this to be completed next week in [place] by Dr. Hayden Maldonado         Normal GYN exam. Encouraged SBE, pt is aware how to do self breast exam and the importance of same. Discussed weight management and importance of maintaining a healthy weight. Discussed Vitamin D intake and the importance of adequate vitamin D for both bone health and a healthy immune system.  Discussed daily exercise and the importance of same, in regards to a healthy heart as well as helping to maintain her weight and improving her mental health.  Colonoscopy will be scheduled. Mammogram will be scheduled. Bone density is not indicated. Pap smear is done per ASCCP guidelines. HPV is done. Lab work up is up to date.      BMI is >= 30 and <35. (Class 1 Obesity). The following options were offered after discussion;: information on healthy  weight added to patient's after visit summary       Radha Swan, APRN  1/7/2025

## 2025-01-09 ENCOUNTER — PATIENT ROUNDING (BHMG ONLY) (OUTPATIENT)
Age: 45
End: 2025-01-09
Payer: COMMERCIAL

## 2025-01-09 LAB
GEN CATEG CVX/VAG CYTO-IMP: ABNORMAL
HPV I/H RISK 4 DNA CVX QL PROBE+SIG AMP: NOT DETECTED
LAB AP CASE REPORT: ABNORMAL
LAB AP GYN ADDITIONAL INFORMATION: ABNORMAL
Lab: ABNORMAL
PATH INTERP SPEC-IMP: ABNORMAL
STAT OF ADQ CVX/VAG CYTO-IMP: ABNORMAL

## 2025-01-09 NOTE — PROGRESS NOTES
January 9, 2025    Hello, may I speak with Neelima Gonzalez?    My name is Mari       I am  with [address]  [facility]  [address]  [facility] [address] [phone].    Before we get started may I verify your date of birth? 1980    I am calling to officially welcome you to our practice and ask about your recent visit. Is this a good time to talk? yes    Tell me about your visit with us. What things went well?  it was very good       We're always looking for ways to make our patients' experiences even better. Do you have recommendations on ways we may improve?  no    Overall were you satisfied with your first visit to our practice? yes       I appreciate you taking the time to speak with me today. Is there anything else I can do for you? no      Thank you, and have a great day.

## 2025-05-05 ENCOUNTER — TELEPHONE (OUTPATIENT)
Dept: HEMATOLOGY | Age: 45
End: 2025-05-05

## 2025-05-07 DIAGNOSIS — D69.6 THROMBOCYTOPENIA: ICD-10-CM

## 2025-05-07 DIAGNOSIS — E83.110 HEREDITARY HEMOCHROMATOSIS: Primary | ICD-10-CM

## 2025-05-07 NOTE — PROGRESS NOTES
05/08/2025     05/08/2025     Lab Results   Component Value Date    NEUTROABS 4.04 05/08/2025       VISIT DIAGNOSES  1. Hereditary hemochromatosis    2. Thrombocytopenia      Assessment & Plan  1. Hemochromatosis:     Heterozygous H63D Hereditary hemochromatosis -stable      Serology 4/22/2024  Serum FE-44  TIBC-370  FE sat-12%  Ferritin-19.3     CMP-ALT 55, AST 40    Serology 11/7/2024  Serum FE-29  TIBC-360  FE sat-8%  Ferritin-14.6    CMP-WNL          Currently at goal    PLAN  CMP, iron panel and ferritin  Weight loss for fatty liver      2.  Iron deficiency anemia secondary to menstruation          Hemoglobin 11.4, cell size 75.  She is symptomatic.  We will need to initiate iron replacement monitor ferritin LFTs.    PLAN  - Initiate one iron tablet daily.  - If symptoms persist or worsen after 3 weeks, increase dosage to twice daily until symptom improvement is observed.  - Gradually reduce dosage once symptoms begin to improve.  - Take iron tablet with a small glass of orange juice to enhance absorption.  - Discussed potential side effects including changes in bowel movement color, constipation, and abdominal cramping.  - Iron levels will be reassessed during the next visit.          The patient (or guardian, if applicable) and other individuals in attendance with the patient were advised that Artificial Intelligence will be utilized during this visit to record, process the conversation to generate a clinical note, and support improvement of the AI technology. The patient (or guardian, if applicable) and other individuals in attendance at the appointment consented to the use of AI, including the recording.          HEALTH MAINTENANCE    Cervical cancer screening. Pap annually per Dr. King.  1/2024 reported negative    Breast cancer screening.  Bilateral screening mammogram at [facility] 5/31/2024 was BI-RADS 1    Colon cancer screening - Screening colonoscopy on 1/13/2025 per Dr. Beal

## 2025-05-08 ENCOUNTER — HOSPITAL ENCOUNTER (OUTPATIENT)
Dept: INFUSION THERAPY | Age: 45
Discharge: HOME | End: 2025-05-08
Payer: COMMERCIAL

## 2025-05-08 ENCOUNTER — OFFICE VISIT (OUTPATIENT)
Dept: HEMATOLOGY | Age: 45
End: 2025-05-08
Payer: COMMERCIAL

## 2025-05-08 VITALS
WEIGHT: 208.6 LBS | BODY MASS INDEX: 35.61 KG/M2 | TEMPERATURE: 97.9 F | SYSTOLIC BLOOD PRESSURE: 150 MMHG | OXYGEN SATURATION: 99 % | HEIGHT: 64 IN | HEART RATE: 105 BPM | DIASTOLIC BLOOD PRESSURE: 90 MMHG

## 2025-05-08 DIAGNOSIS — E83.110 HEREDITARY HEMOCHROMATOSIS: Primary | ICD-10-CM

## 2025-05-08 DIAGNOSIS — E83.110 HEREDITARY HEMOCHROMATOSIS: ICD-10-CM

## 2025-05-08 DIAGNOSIS — D69.6 THROMBOCYTOPENIA: ICD-10-CM

## 2025-05-08 LAB
ALBUMIN SERPL-MCNC: 4.3 G/DL (ref 3.5–5.2)
ALP SERPL-CCNC: 104 U/L (ref 35–104)
ALT SERPL-CCNC: 49 U/L (ref 5–33)
ANION GAP SERPL CALCULATED.3IONS-SCNC: 13 MMOL/L (ref 7–19)
AST SERPL-CCNC: 46 U/L (ref 5–32)
BASOPHILS # BLD: 0.06 K/UL (ref 0–0.2)
BASOPHILS NFR BLD: 0.8 % (ref 0–1)
BILIRUB SERPL-MCNC: 0.4 MG/DL (ref 0–1.2)
BUN SERPL-MCNC: 10 MG/DL (ref 6–20)
CALCIUM SERPL-MCNC: 9.4 MG/DL (ref 8.6–10)
CHLORIDE SERPL-SCNC: 100 MMOL/L (ref 98–107)
CO2 SERPL-SCNC: 25 MMOL/L (ref 22–29)
CREAT SERPL-MCNC: 0.7 MG/DL (ref 0.5–0.9)
EOSINOPHIL # BLD: 0.33 K/UL (ref 0–0.6)
EOSINOPHIL NFR BLD: 4.6 % (ref 0–5)
ERYTHROCYTE [DISTWIDTH] IN BLOOD BY AUTOMATED COUNT: 16.8 % (ref 11.5–14.5)
FERRITIN SERPL-MCNC: 14.7 NG/ML (ref 13–150)
GLUCOSE SERPL-MCNC: 224 MG/DL (ref 70–99)
HCT VFR BLD AUTO: 37.3 % (ref 37–47)
HGB BLD-MCNC: 11.4 G/DL (ref 12–16)
IRON SATN MFR SERPL: 7 % (ref 15–50)
IRON SERPL-MCNC: 28 UG/DL (ref 37–145)
LYMPHOCYTES # BLD: 2.23 K/UL (ref 1.1–4.5)
LYMPHOCYTES NFR BLD: 30.8 % (ref 20–40)
MCH RBC QN AUTO: 22.9 PG (ref 27–31)
MCHC RBC AUTO-ENTMCNC: 30.6 G/DL (ref 33–37)
MCV RBC AUTO: 75.1 FL (ref 81–99)
MONOCYTES # BLD: 0.55 K/UL (ref 0–0.9)
MONOCYTES NFR BLD: 7.6 % (ref 1–10)
NEUTROPHILS # BLD: 4.04 K/UL (ref 1.5–7.5)
NEUTS SEG NFR BLD: 55.8 % (ref 50–65)
PLATELET # BLD AUTO: 396 K/UL (ref 130–400)
PMV BLD AUTO: 10.5 FL (ref 9.4–12.3)
POTASSIUM SERPL-SCNC: 3.9 MMOL/L (ref 3.5–5.1)
PROT SERPL-MCNC: 7.7 G/DL (ref 6.4–8.3)
RBC # BLD AUTO: 4.97 M/UL (ref 4.2–5.4)
SODIUM SERPL-SCNC: 138 MMOL/L (ref 136–145)
TIBC SERPL-MCNC: 405 UG/DL (ref 250–400)
WBC # BLD AUTO: 7.24 K/UL (ref 4.8–10.8)

## 2025-05-08 PROCEDURE — 80053 COMPREHEN METABOLIC PANEL: CPT

## 2025-05-08 PROCEDURE — 99212 OFFICE O/P EST SF 10 MIN: CPT

## 2025-05-08 PROCEDURE — 85025 COMPLETE CBC W/AUTO DIFF WBC: CPT

## 2025-05-08 PROCEDURE — 83550 IRON BINDING TEST: CPT

## 2025-05-08 PROCEDURE — 83540 ASSAY OF IRON: CPT

## 2025-05-08 PROCEDURE — 82728 ASSAY OF FERRITIN: CPT

## 2025-05-08 PROCEDURE — 36415 COLL VENOUS BLD VENIPUNCTURE: CPT

## 2025-05-08 PROCEDURE — 99214 OFFICE O/P EST MOD 30 MIN: CPT | Performed by: PHYSICIAN ASSISTANT

## 2025-05-08 RX ORDER — GLYBURIDE 2.5 MG/1
2.5 TABLET ORAL DAILY
COMMUNITY
Start: 2025-04-03

## 2025-05-08 RX ORDER — FERROUS SULFATE 325(65) MG
325 TABLET ORAL
Qty: 90 TABLET | Refills: 1 | Status: SHIPPED | OUTPATIENT
Start: 2025-05-08

## 2025-05-08 ASSESSMENT — ENCOUNTER SYMPTOMS
SHORTNESS OF BREATH: 0
VOMITING: 0
ABDOMINAL DISTENTION: 0
CONSTIPATION: 0
BLOOD IN STOOL: 0
NAUSEA: 0
ABDOMINAL PAIN: 0
COUGH: 0
EYE DISCHARGE: 0
VOICE CHANGE: 0
SORE THROAT: 0
EYE ITCHING: 0
PHOTOPHOBIA: 0
DIARRHEA: 0
COLOR CHANGE: 0
BACK PAIN: 0
TROUBLE SWALLOWING: 0
WHEEZING: 0

## 2025-06-09 DIAGNOSIS — Z12.31 ENCOUNTER FOR SCREENING MAMMOGRAM FOR BREAST CANCER: ICD-10-CM

## 2025-08-19 ENCOUNTER — TELEPHONE (OUTPATIENT)
Dept: HEMATOLOGY | Age: 45
End: 2025-08-19

## 2025-08-21 ENCOUNTER — HOSPITAL ENCOUNTER (OUTPATIENT)
Dept: INFUSION THERAPY | Age: 45
Discharge: HOME | End: 2025-08-21
Payer: COMMERCIAL

## 2025-08-21 ENCOUNTER — OFFICE VISIT (OUTPATIENT)
Dept: HEMATOLOGY | Age: 45
End: 2025-08-21
Payer: COMMERCIAL

## 2025-08-21 VITALS
BODY MASS INDEX: 34.67 KG/M2 | WEIGHT: 203.1 LBS | HEART RATE: 78 BPM | OXYGEN SATURATION: 98 % | HEIGHT: 64 IN | SYSTOLIC BLOOD PRESSURE: 138 MMHG | DIASTOLIC BLOOD PRESSURE: 90 MMHG | TEMPERATURE: 97.2 F

## 2025-08-21 DIAGNOSIS — E83.110 HEREDITARY HEMOCHROMATOSIS: Primary | ICD-10-CM

## 2025-08-21 DIAGNOSIS — D69.6 THROMBOCYTOPENIA: ICD-10-CM

## 2025-08-21 DIAGNOSIS — G47.33 OBSTRUCTIVE SLEEP APNEA SYNDROME: ICD-10-CM

## 2025-08-21 DIAGNOSIS — E83.110 HEREDITARY HEMOCHROMATOSIS: ICD-10-CM

## 2025-08-21 DIAGNOSIS — R10.11 RIGHT UPPER QUADRANT PAIN: ICD-10-CM

## 2025-08-21 LAB
ALBUMIN SERPL-MCNC: 3.9 G/DL (ref 3.5–5.2)
ALP SERPL-CCNC: 85 U/L (ref 35–104)
ALT SERPL-CCNC: 32 U/L (ref 5–33)
ANION GAP SERPL CALCULATED.3IONS-SCNC: 13 MMOL/L (ref 7–19)
AST SERPL-CCNC: 32 U/L (ref 5–32)
BASOPHILS # BLD: 0.05 K/UL (ref 0–0.2)
BASOPHILS NFR BLD: 0.7 % (ref 0–1)
BILIRUB SERPL-MCNC: 0.4 MG/DL (ref 0–1.2)
BUN SERPL-MCNC: 12 MG/DL (ref 6–20)
CALCIUM SERPL-MCNC: 8.7 MG/DL (ref 8.6–10)
CHLORIDE SERPL-SCNC: 102 MMOL/L (ref 98–107)
CO2 SERPL-SCNC: 22 MMOL/L (ref 22–29)
CREAT SERPL-MCNC: 0.7 MG/DL (ref 0.5–0.9)
EOSINOPHIL # BLD: 0.28 K/UL (ref 0–0.6)
EOSINOPHIL NFR BLD: 3.9 % (ref 0–5)
ERYTHROCYTE [DISTWIDTH] IN BLOOD BY AUTOMATED COUNT: 17.2 % (ref 11.5–14.5)
FERRITIN SERPL-MCNC: 27.7 NG/ML (ref 13–150)
GLUCOSE SERPL-MCNC: 220 MG/DL (ref 70–99)
HCT VFR BLD AUTO: 37.4 % (ref 37–47)
HGB BLD-MCNC: 11.9 G/DL (ref 12–16)
IRON SATN MFR SERPL: 14 % (ref 15–50)
IRON SERPL-MCNC: 45 UG/DL (ref 37–145)
LYMPHOCYTES # BLD: 2.12 K/UL (ref 1.1–4.5)
LYMPHOCYTES NFR BLD: 29.4 % (ref 20–40)
MCH RBC QN AUTO: 25.6 PG (ref 27–31)
MCHC RBC AUTO-ENTMCNC: 31.8 G/DL (ref 33–37)
MCV RBC AUTO: 80.4 FL (ref 81–99)
MONOCYTES # BLD: 0.52 K/UL (ref 0–0.9)
MONOCYTES NFR BLD: 7.2 % (ref 1–10)
NEUTROPHILS # BLD: 4.21 K/UL (ref 1.5–7.5)
NEUTS SEG NFR BLD: 58.5 % (ref 50–65)
PLATELET # BLD AUTO: 246 K/UL (ref 130–400)
PMV BLD AUTO: 10.5 FL (ref 9.4–12.3)
POTASSIUM SERPL-SCNC: 3.9 MMOL/L (ref 3.5–5.1)
PROT SERPL-MCNC: 6.7 G/DL (ref 6.4–8.3)
RBC # BLD AUTO: 4.65 M/UL (ref 4.2–5.4)
SODIUM SERPL-SCNC: 137 MMOL/L (ref 136–145)
TIBC SERPL-MCNC: 323 UG/DL (ref 250–400)
WBC # BLD AUTO: 7.2 K/UL (ref 4.8–10.8)

## 2025-08-21 PROCEDURE — 83550 IRON BINDING TEST: CPT

## 2025-08-21 PROCEDURE — 99214 OFFICE O/P EST MOD 30 MIN: CPT | Performed by: PHYSICIAN ASSISTANT

## 2025-08-21 PROCEDURE — 83540 ASSAY OF IRON: CPT

## 2025-08-21 PROCEDURE — 80053 COMPREHEN METABOLIC PANEL: CPT

## 2025-08-21 PROCEDURE — G2211 COMPLEX E/M VISIT ADD ON: HCPCS | Performed by: PHYSICIAN ASSISTANT

## 2025-08-21 PROCEDURE — 85025 COMPLETE CBC W/AUTO DIFF WBC: CPT

## 2025-08-21 PROCEDURE — 36415 COLL VENOUS BLD VENIPUNCTURE: CPT

## 2025-08-21 PROCEDURE — 99212 OFFICE O/P EST SF 10 MIN: CPT

## 2025-08-21 PROCEDURE — 82728 ASSAY OF FERRITIN: CPT

## 2025-08-21 RX ORDER — LOSARTAN POTASSIUM 25 MG/1
25 TABLET ORAL DAILY
COMMUNITY
Start: 2025-07-29

## 2025-08-21 ASSESSMENT — ENCOUNTER SYMPTOMS
NAUSEA: 0
EYE ITCHING: 0
ABDOMINAL PAIN: 1
SORE THROAT: 0
TROUBLE SWALLOWING: 0
DIARRHEA: 0
PHOTOPHOBIA: 0
CONSTIPATION: 0
BLOOD IN STOOL: 0
ABDOMINAL DISTENTION: 0
VOMITING: 0
COLOR CHANGE: 0
SHORTNESS OF BREATH: 0
BACK PAIN: 0
COUGH: 0
WHEEZING: 0
EYE DISCHARGE: 0
VOICE CHANGE: 0

## (undated) DEVICE — TUBING, SUCTION, 1/4" X 12', STRAIGHT: Brand: MEDLINE

## (undated) DEVICE — SYR CONTRL LUERLOK 10CC

## (undated) DEVICE — CAP CONN RED

## (undated) DEVICE — BITEBLOCK ENDO W/STRAP 60F A/ LF DISP

## (undated) DEVICE — PK TURNOVER RM ADV

## (undated) DEVICE — ST TB EXT STANDARDBORE 30IN

## (undated) DEVICE — CYSTO/BLADDER IRRIGATION SET, REGULATING CLAMP

## (undated) DEVICE — NDL HYPO PRECISIONGLIDE REG 22G 1 1/2

## (undated) DEVICE — CANN SMPL SOFTECH BIFLO ETCO2 A/M 7FT

## (undated) DEVICE — SINGLE-USE BIOPSY FORCEPS: Brand: RADIAL JAW 4

## (undated) DEVICE — PAD D&C: Brand: MEDLINE INDUSTRIES, INC.

## (undated) DEVICE — TRAP,MUCUS SPECIMEN,40CC: Brand: MEDLINE

## (undated) DEVICE — GLV SURG TRIUMPH ORTHO W/ALOE PF LTX 7.0